# Patient Record
Sex: FEMALE | Race: WHITE | Employment: UNEMPLOYED | RURAL
[De-identification: names, ages, dates, MRNs, and addresses within clinical notes are randomized per-mention and may not be internally consistent; named-entity substitution may affect disease eponyms.]

---

## 2017-03-29 ENCOUNTER — HOSPITAL ENCOUNTER (OUTPATIENT)
Dept: OTHER | Age: 37
Discharge: HOME | End: 2017-03-29
Attending: PEDIATRICS | Admitting: PEDIATRICS

## 2017-03-29 ENCOUNTER — OFFICE VISIT (OUTPATIENT)
Dept: PRIMARY CARE CLINIC | Age: 37
End: 2017-03-29
Payer: MEDICAID

## 2017-03-29 VITALS
DIASTOLIC BLOOD PRESSURE: 76 MMHG | WEIGHT: 253 LBS | BODY MASS INDEX: 42.15 KG/M2 | HEIGHT: 65 IN | HEART RATE: 82 BPM | RESPIRATION RATE: 18 BRPM | SYSTOLIC BLOOD PRESSURE: 132 MMHG | OXYGEN SATURATION: 98 %

## 2017-03-29 DIAGNOSIS — R53.83 FATIGUE, UNSPECIFIED TYPE: ICD-10-CM

## 2017-03-29 DIAGNOSIS — R53.83 FATIGUE, UNSPECIFIED TYPE: Primary | ICD-10-CM

## 2017-03-29 DIAGNOSIS — R63.5 WEIGHT GAIN: ICD-10-CM

## 2017-03-29 DIAGNOSIS — F32.A DEPRESSION, UNSPECIFIED DEPRESSION TYPE: ICD-10-CM

## 2017-03-29 DIAGNOSIS — R40.0 DAYTIME SLEEPINESS: ICD-10-CM

## 2017-03-29 DIAGNOSIS — F17.200 TOBACCO USE DISORDER: ICD-10-CM

## 2017-03-29 LAB
A/G RATIO: 1.6 (ref 0.8–2)
ALBUMIN SERPL-MCNC: 4.5 G/DL (ref 3.4–4.8)
ALP BLD-CCNC: 59 U/L (ref 25–100)
ALT SERPL-CCNC: 43 U/L (ref 4–36)
ANION GAP SERPL CALCULATED.3IONS-SCNC: 14 MMOL/L (ref 3–16)
AST SERPL-CCNC: 22 U/L (ref 8–33)
BASOPHILS ABSOLUTE: 0 K/UL (ref 0–0.1)
BASOPHILS RELATIVE PERCENT: 0.2 %
BILIRUB SERPL-MCNC: <0.2 MG/DL (ref 0.3–1.2)
BUN BLDV-MCNC: 19 MG/DL (ref 6–20)
CALCIUM SERPL-MCNC: 9.9 MG/DL (ref 8.5–10.5)
CHLORIDE BLD-SCNC: 101 MMOL/L (ref 98–107)
CHOLESTEROL, TOTAL: 263 MG/DL (ref 0–200)
CO2: 22 MMOL/L (ref 20–30)
CREAT SERPL-MCNC: 0.8 MG/DL (ref 0.4–1.2)
EOSINOPHILS ABSOLUTE: 0.1 K/UL (ref 0–0.4)
EOSINOPHILS RELATIVE PERCENT: 1.7 %
GFR AFRICAN AMERICAN: >59
GFR NON-AFRICAN AMERICAN: >60
GLOBULIN: 2.9 G/DL
GLUCOSE BLD-MCNC: 108 MG/DL (ref 74–106)
HBA1C MFR BLD: 5.1 %
HCT VFR BLD CALC: 41.3 % (ref 37–47)
HDLC SERPL-MCNC: 47 MG/DL (ref 40–60)
HEMOGLOBIN: 14 G/DL (ref 11.5–16.5)
LDL CHOLESTEROL CALCULATED: 182 MG/DL
LYMPHOCYTES ABSOLUTE: 2.2 K/UL (ref 1.5–4)
LYMPHOCYTES RELATIVE PERCENT: 27.2 % (ref 20–40)
MCH RBC QN AUTO: 29.5 PG (ref 27–32)
MCHC RBC AUTO-ENTMCNC: 33.9 G/DL (ref 31–35)
MCV RBC AUTO: 86.9 FL (ref 80–100)
MONOCYTES ABSOLUTE: 0.6 K/UL (ref 0.2–0.8)
MONOCYTES RELATIVE PERCENT: 7 % (ref 3–10)
NEUTROPHILS ABSOLUTE: 5.2 K/UL (ref 2–7.5)
NEUTROPHILS RELATIVE PERCENT: 63.9 %
PDW BLD-RTO: 13.5 % (ref 11–16)
PLATELET # BLD: 292 K/UL (ref 150–400)
PMV BLD AUTO: 9.9 FL (ref 6–10)
POTASSIUM SERPL-SCNC: 4.5 MMOL/L (ref 3.4–5.1)
RBC # BLD: 4.75 M/UL (ref 3.8–5.8)
SODIUM BLD-SCNC: 137 MMOL/L (ref 136–145)
T4 FREE: 0.92 NG/DL (ref 0.89–1.76)
TOTAL PROTEIN: 7.4 G/DL (ref 6.4–8.3)
TRIGL SERPL-MCNC: 171 MG/DL (ref 0–249)
TSH REFLEX: 14.55 UIU/ML (ref 0.35–5.5)
VLDLC SERPL CALC-MCNC: 34 MG/DL
WBC # BLD: 8.2 K/UL (ref 4–11)

## 2017-03-29 PROCEDURE — 99203 OFFICE O/P NEW LOW 30 MIN: CPT | Performed by: PEDIATRICS

## 2017-03-29 RX ORDER — BUPROPION HYDROCHLORIDE 150 MG/1
150 TABLET ORAL EVERY MORNING
Qty: 30 TABLET | Refills: 3 | Status: SHIPPED | OUTPATIENT
Start: 2017-03-29 | End: 2017-03-29 | Stop reason: SDUPTHER

## 2017-03-29 RX ORDER — BUPROPION HYDROCHLORIDE 150 MG/1
150 TABLET ORAL EVERY MORNING
Qty: 30 TABLET | Refills: 3 | Status: SHIPPED | OUTPATIENT
Start: 2017-03-29 | End: 2018-11-21

## 2017-03-29 ASSESSMENT — ENCOUNTER SYMPTOMS
WHEEZING: 0
VOMITING: 0
SORE THROAT: 0
NAUSEA: 0
SINUS PRESSURE: 0
ABDOMINAL PAIN: 0
COUGH: 0
EYE DISCHARGE: 0
SHORTNESS OF BREATH: 0
BACK PAIN: 0

## 2017-03-30 RX ORDER — LEVOTHYROXINE SODIUM 0.05 MG/1
50 TABLET ORAL DAILY
Qty: 30 TABLET | Refills: 3 | Status: SHIPPED | OUTPATIENT
Start: 2017-03-30 | End: 2018-11-21

## 2017-03-30 RX ORDER — ATORVASTATIN CALCIUM 10 MG/1
10 TABLET, FILM COATED ORAL DAILY
Qty: 30 TABLET | Refills: 3 | Status: SHIPPED | OUTPATIENT
Start: 2017-03-30 | End: 2018-11-21

## 2018-03-04 ENCOUNTER — OFFICE VISIT (OUTPATIENT)
Dept: PRIMARY CARE CLINIC | Age: 38
End: 2018-03-04
Payer: MEDICAID

## 2018-03-04 VITALS — HEART RATE: 120 BPM | BODY MASS INDEX: 42.6 KG/M2 | WEIGHT: 256 LBS | TEMPERATURE: 97 F | OXYGEN SATURATION: 98 %

## 2018-03-04 DIAGNOSIS — B86 SCABIES INFESTATION: Primary | ICD-10-CM

## 2018-03-04 DIAGNOSIS — L29.9 ITCHING: ICD-10-CM

## 2018-03-04 PROCEDURE — 99213 OFFICE O/P EST LOW 20 MIN: CPT | Performed by: NURSE PRACTITIONER

## 2018-03-04 RX ORDER — METHYLPREDNISOLONE 4 MG/1
TABLET ORAL
Qty: 1 KIT | Refills: 0 | Status: SHIPPED | OUTPATIENT
Start: 2018-03-04 | End: 2018-03-10

## 2018-03-04 RX ORDER — PERMETHRIN 50 MG/G
CREAM TOPICAL
Qty: 60 G | Refills: 0 | Status: SHIPPED | OUTPATIENT
Start: 2018-03-04 | End: 2018-11-21

## 2018-03-04 RX ORDER — HYDROXYZINE HYDROCHLORIDE 25 MG/1
25 TABLET, FILM COATED ORAL 3 TIMES DAILY PRN
Qty: 20 TABLET | Refills: 1 | Status: SHIPPED | OUTPATIENT
Start: 2018-03-04 | End: 2018-11-21

## 2018-03-04 ASSESSMENT — ENCOUNTER SYMPTOMS
ALLERGIC/IMMUNOLOGIC NEGATIVE: 1
GASTROINTESTINAL NEGATIVE: 1
RESPIRATORY NEGATIVE: 1
EYES NEGATIVE: 1

## 2018-03-04 NOTE — PROGRESS NOTES
Subjective:      Patient ID: Linda Garner is a 42 y.o. female. Patient is a 69-year-old female that presents with a greater than two-week history of diffuse rash with itching all over her body. Patient is not sought treatment for her symptoms. She denies any changes in her immediate environment. Patient states that her boyfriend as well as her son have now developed a rash with similar symptoms. The mother denies any recent travel. Rash         Review of Systems   Constitutional: Negative. HENT: Negative. Eyes: Negative. Respiratory: Negative. Cardiovascular: Negative. Gastrointestinal: Negative. Endocrine: Negative. Genitourinary: Negative. Musculoskeletal: Negative. Skin: Positive for rash. Positive for two-week history of rash with itching   Allergic/Immunologic: Negative. Neurological: Negative. Hematological: Negative. Psychiatric/Behavioral: Negative. All other systems reviewed and are negative. Objective:   Physical Exam   Constitutional: She is oriented to person, place, and time. She appears well-developed and well-nourished. HENT:   Head: Normocephalic. Right Ear: External ear normal.   Left Ear: External ear normal.   Mouth/Throat: Oropharynx is clear and moist.   Eyes: Pupils are equal, round, and reactive to light. Neck: Normal range of motion. Cardiovascular: Normal rate, regular rhythm and normal heart sounds. Pulmonary/Chest: Effort normal and breath sounds normal.   Musculoskeletal: Normal range of motion. She exhibits no edema. Neurological: She is alert and oriented to person, place, and time. Skin: Skin is warm and dry. Rash noted. Patient has diffuse small pinpoint red eruptions on both arms and hands and there is rash in between the fingers. She also has a diffuse rash on her back and on her abdomen with the appearance of small red bumps and with burrows. The rash is suggestive of scabies.    Psychiatric: She has a

## 2018-05-15 ENCOUNTER — HOSPITAL ENCOUNTER (EMERGENCY)
Facility: HOSPITAL | Age: 38
Discharge: HOME | End: 2018-05-15
Attending: EMERGENCY MEDICINE | Admitting: EMERGENCY MEDICINE

## 2018-05-15 VITALS
HEIGHT: 63 IN | RESPIRATION RATE: 16 BRPM | TEMPERATURE: 98.2 F | OXYGEN SATURATION: 96 % | BODY MASS INDEX: 46.25 KG/M2 | HEART RATE: 70 BPM | DIASTOLIC BLOOD PRESSURE: 99 MMHG | SYSTOLIC BLOOD PRESSURE: 122 MMHG | WEIGHT: 261 LBS

## 2018-05-15 DIAGNOSIS — N39.3 STRESS INCONTINENCE IN FEMALE: ICD-10-CM

## 2018-05-15 DIAGNOSIS — A08.4 GASTROENTERITIS AND COLITIS, VIRAL: Primary | ICD-10-CM

## 2018-05-15 LAB
ANION GAP SERPL CALCULATED.3IONS-SCNC: 14.2 MMOL/L (ref 10–20)
B-HCG UR QL: NEGATIVE
BASOPHILS # BLD AUTO: 0.04 10*3/MM3 (ref 0–0.2)
BASOPHILS NFR BLD AUTO: 0.6 % (ref 0–2.5)
BILIRUB UR QL STRIP: NEGATIVE
BUN BLD-MCNC: 20 MG/DL (ref 7–20)
BUN/CREAT SERPL: 25 (ref 7.1–23.5)
CALCIUM SPEC-SCNC: 8.5 MG/DL (ref 8.4–10.2)
CHLORIDE SERPL-SCNC: 106 MMOL/L (ref 98–107)
CLARITY UR: CLEAR
CO2 SERPL-SCNC: 23 MMOL/L (ref 26–30)
COLOR UR: YELLOW
CREAT BLD-MCNC: 0.8 MG/DL (ref 0.6–1.3)
DEPRECATED RDW RBC AUTO: 40.5 FL (ref 37–54)
EOSINOPHIL # BLD AUTO: 0.12 10*3/MM3 (ref 0–0.7)
EOSINOPHIL NFR BLD AUTO: 1.8 % (ref 0–7)
ERYTHROCYTE [DISTWIDTH] IN BLOOD BY AUTOMATED COUNT: 12.5 % (ref 11.5–14.5)
GFR SERPL CREATININE-BSD FRML MDRD: 80 ML/MIN/1.73
GLUCOSE BLD-MCNC: 97 MG/DL (ref 74–98)
GLUCOSE UR STRIP-MCNC: NEGATIVE MG/DL
HCT VFR BLD AUTO: 37.5 % (ref 37–47)
HGB BLD-MCNC: 12.7 G/DL (ref 12–16)
HGB UR QL STRIP.AUTO: NEGATIVE
IMM GRANULOCYTES # BLD: 0.03 10*3/MM3 (ref 0–0.06)
IMM GRANULOCYTES NFR BLD: 0.4 % (ref 0–0.6)
KETONES UR QL STRIP: NEGATIVE
LEUKOCYTE ESTERASE UR QL STRIP.AUTO: NEGATIVE
LYMPHOCYTES # BLD AUTO: 2.34 10*3/MM3 (ref 0.6–3.4)
LYMPHOCYTES NFR BLD AUTO: 34.8 % (ref 10–50)
MCH RBC QN AUTO: 29.7 PG (ref 27–31)
MCHC RBC AUTO-ENTMCNC: 33.9 G/DL (ref 30–37)
MCV RBC AUTO: 87.8 FL (ref 81–99)
MONOCYTES # BLD AUTO: 0.46 10*3/MM3 (ref 0–0.9)
MONOCYTES NFR BLD AUTO: 6.8 % (ref 0–12)
NEUTROPHILS # BLD AUTO: 3.73 10*3/MM3 (ref 2–6.9)
NEUTROPHILS NFR BLD AUTO: 55.6 % (ref 37–80)
NITRITE UR QL STRIP: NEGATIVE
NRBC BLD MANUAL-RTO: 0 /100 WBC (ref 0–0)
PH UR STRIP.AUTO: 5.5 [PH] (ref 5–8)
PLATELET # BLD AUTO: 227 10*3/MM3 (ref 130–400)
PMV BLD AUTO: 9.3 FL (ref 6–12)
POTASSIUM BLD-SCNC: 4.2 MMOL/L (ref 3.5–5.1)
PROT UR QL STRIP: NEGATIVE
RBC # BLD AUTO: 4.27 10*6/MM3 (ref 4.2–5.4)
SODIUM BLD-SCNC: 139 MMOL/L (ref 137–145)
SP GR UR STRIP: 1.02 (ref 1–1.03)
UROBILINOGEN UR QL STRIP: NORMAL
WBC NRBC COR # BLD: 6.72 10*3/MM3 (ref 4.8–10.8)

## 2018-05-15 PROCEDURE — 80048 BASIC METABOLIC PNL TOTAL CA: CPT | Performed by: EMERGENCY MEDICINE

## 2018-05-15 PROCEDURE — 81025 URINE PREGNANCY TEST: CPT | Performed by: EMERGENCY MEDICINE

## 2018-05-15 PROCEDURE — 99283 EMERGENCY DEPT VISIT LOW MDM: CPT

## 2018-05-15 PROCEDURE — 85025 COMPLETE CBC W/AUTO DIFF WBC: CPT | Performed by: EMERGENCY MEDICINE

## 2018-05-15 PROCEDURE — 81003 URINALYSIS AUTO W/O SCOPE: CPT | Performed by: EMERGENCY MEDICINE

## 2018-05-15 RX ORDER — ONDANSETRON 4 MG/1
4 TABLET, ORALLY DISINTEGRATING ORAL EVERY 6 HOURS PRN
Qty: 10 TABLET | Refills: 0 | Status: SHIPPED | OUTPATIENT
Start: 2018-05-15

## 2018-05-15 RX ORDER — ONDANSETRON 4 MG/1
4 TABLET, ORALLY DISINTEGRATING ORAL ONCE
Status: COMPLETED | OUTPATIENT
Start: 2018-05-15 | End: 2018-05-15

## 2018-05-15 RX ADMIN — ONDANSETRON 4 MG: 4 TABLET, ORALLY DISINTEGRATING ORAL at 09:04

## 2018-11-21 ENCOUNTER — HOSPITAL ENCOUNTER (EMERGENCY)
Facility: HOSPITAL | Age: 38
Discharge: HOME | End: 2018-11-21
Attending: EMERGENCY MEDICINE
Payer: MEDICAID

## 2018-11-21 VITALS
RESPIRATION RATE: 20 BRPM | OXYGEN SATURATION: 96 % | SYSTOLIC BLOOD PRESSURE: 136 MMHG | HEIGHT: 64 IN | TEMPERATURE: 97.7 F | BODY MASS INDEX: 47.8 KG/M2 | HEART RATE: 85 BPM | DIASTOLIC BLOOD PRESSURE: 108 MMHG | WEIGHT: 280 LBS

## 2018-11-21 DIAGNOSIS — S61.012A LACERATION OF LEFT THUMB WITHOUT FOREIGN BODY WITHOUT DAMAGE TO NAIL, INITIAL ENCOUNTER: Primary | ICD-10-CM

## 2018-11-21 PROCEDURE — 6360000002 HC RX W HCPCS: Performed by: EMERGENCY MEDICINE

## 2018-11-21 PROCEDURE — 12001 RPR S/N/AX/GEN/TRNK 2.5CM/<: CPT

## 2018-11-21 PROCEDURE — 2500000003 HC RX 250 WO HCPCS

## 2018-11-21 PROCEDURE — 90471 IMMUNIZATION ADMIN: CPT | Performed by: EMERGENCY MEDICINE

## 2018-11-21 PROCEDURE — 90715 TDAP VACCINE 7 YRS/> IM: CPT | Performed by: EMERGENCY MEDICINE

## 2018-11-21 PROCEDURE — 99282 EMERGENCY DEPT VISIT SF MDM: CPT

## 2018-11-21 RX ORDER — LIDOCAINE HYDROCHLORIDE 20 MG/ML
10 INJECTION, SOLUTION INFILTRATION; PERINEURAL ONCE
Status: DISCONTINUED | OUTPATIENT
Start: 2018-11-21 | End: 2018-11-21

## 2018-11-21 RX ORDER — LIDOCAINE HYDROCHLORIDE 10 MG/ML
5 INJECTION, SOLUTION INFILTRATION; PERINEURAL ONCE
Status: COMPLETED | OUTPATIENT
Start: 2018-11-21 | End: 2018-11-21

## 2018-11-21 RX ORDER — LIDOCAINE HYDROCHLORIDE 10 MG/ML
INJECTION, SOLUTION INFILTRATION; PERINEURAL
Status: COMPLETED
Start: 2018-11-21 | End: 2018-11-21

## 2018-11-21 RX ADMIN — LIDOCAINE HYDROCHLORIDE 5 ML: 10 INJECTION, SOLUTION INFILTRATION; PERINEURAL at 10:41

## 2018-11-21 RX ADMIN — TETANUS TOXOID, REDUCED DIPHTHERIA TOXOID AND ACELLULAR PERTUSSIS VACCINE, ADSORBED 0.5 ML: 5; 2.5; 8; 8; 2.5 SUSPENSION INTRAMUSCULAR at 10:40

## 2018-11-21 ASSESSMENT — PAIN SCALES - GENERAL: PAINLEVEL_OUTOF10: 0

## 2018-11-21 NOTE — ED PROVIDER NOTES
father well          SOCIAL HISTORY       Social History     Social History    Marital status: Single     Spouse name: N/A    Number of children: N/A    Years of education: N/A     Social History Main Topics    Smoking status: Current Every Day Smoker     Packs/day: 1.00     Years: 10.00     Types: Cigarettes    Smokeless tobacco: Never Used    Alcohol use Yes      Comment: drinks liquor socially just ever so often    Drug use: No    Sexual activity: Not Asked     Other Topics Concern    None     Social History Narrative    None         PHYSICAL EXAM    (up to 7 for level 4, 8 or more for level 5)     ED Triage Vitals [11/21/18 1023]   BP Temp Temp Source Pulse Resp SpO2 Height Weight   (!) 136/108 97.7 °F (36.5 °C) Oral 85 20 96 % 5' 4\" (1.626 m) 280 lb (127 kg)       Physical Exam  General :Patient is awake, alert, oriented, in no acute distress, nontoxic appearing  HEENT: Pupils are equally round and reactive to light, EOMI, conjunctivae clear, sclerae white, there is no injection no icterus. Oral mucosa ismoist, no exudate. Uvula is midline  Neck: Neck is supple, full range of motion, trachea midline  Cardiac: Heart regular rate, rhythm, no murmurs, rubs, or gallops  Lungs: Lungs are clear to auscultation, there is no wheezing, rhonchi, or rales. There is no use of accessory muscles. Abdomen: Abdomen is soft, nontender, nondistended. There is no firm or pulsatile masses, no rebound rigidity or guarding. Musculoskeletal: range of motion intact, no signs of any tendon rupture. 5 out of 5 strength in all 4 extremities. No focal muscle deficits are appreciated  Neuro: Motor intact, sensory intact, level of consciousness is normal  Dermatology: there is a laceration of the left thumb, volar surface between the  MCP and the  Interphalangeal joint this is superficial, horizontal in nature, 1 cm in length, with edges approximated nicely. Skin is warm and dry  Psych: Mentation is grossly normal, cognition is

## 2019-03-10 ENCOUNTER — OFFICE VISIT (OUTPATIENT)
Dept: PRIMARY CARE CLINIC | Age: 39
End: 2019-03-10
Payer: MEDICAID

## 2019-03-10 VITALS
BODY MASS INDEX: 44.11 KG/M2 | DIASTOLIC BLOOD PRESSURE: 93 MMHG | HEART RATE: 89 BPM | TEMPERATURE: 98.9 F | WEIGHT: 257 LBS | OXYGEN SATURATION: 97 % | SYSTOLIC BLOOD PRESSURE: 134 MMHG

## 2019-03-10 DIAGNOSIS — R30.0 DYSURIA: Primary | ICD-10-CM

## 2019-03-10 LAB
BILIRUBIN, POC: NEGATIVE
BLOOD URINE, POC: NEGATIVE
CLARITY, POC: CLEAR
COLOR, POC: YELLOW
GLUCOSE URINE, POC: NEGATIVE
KETONES, POC: NEGATIVE
LEUKOCYTE EST, POC: NEGATIVE
NITRITE, POC: NEGATIVE
PH, POC: 5
PROTEIN, POC: POSITIVE
SPECIFIC GRAVITY, POC: 1.01
UROBILINOGEN, POC: 0.2

## 2019-03-10 PROCEDURE — 81002 URINALYSIS NONAUTO W/O SCOPE: CPT | Performed by: NURSE PRACTITIONER

## 2019-03-10 PROCEDURE — 99213 OFFICE O/P EST LOW 20 MIN: CPT | Performed by: NURSE PRACTITIONER

## 2019-03-10 RX ORDER — PHENAZOPYRIDINE HYDROCHLORIDE 100 MG/1
100 TABLET, FILM COATED ORAL 3 TIMES DAILY PRN
Qty: 10 TABLET | Refills: 0 | Status: SHIPPED | OUTPATIENT
Start: 2019-03-10 | End: 2020-02-21

## 2019-03-10 RX ORDER — ONDANSETRON 4 MG/1
4 TABLET, FILM COATED ORAL 3 TIMES DAILY PRN
Qty: 30 TABLET | Refills: 0 | Status: SHIPPED | OUTPATIENT
Start: 2019-03-10 | End: 2020-02-21

## 2019-03-10 RX ORDER — CIPROFLOXACIN 250 MG/1
250 TABLET, FILM COATED ORAL 2 TIMES DAILY
Qty: 6 TABLET | Refills: 0 | Status: SHIPPED | OUTPATIENT
Start: 2019-03-10 | End: 2019-03-13

## 2019-03-10 ASSESSMENT — ENCOUNTER SYMPTOMS: NAUSEA: 1

## 2020-02-21 ENCOUNTER — APPOINTMENT (OUTPATIENT)
Dept: GENERAL RADIOLOGY | Facility: HOSPITAL | Age: 40
End: 2020-02-21
Payer: MEDICAID

## 2020-02-21 ENCOUNTER — HOSPITAL ENCOUNTER (EMERGENCY)
Facility: HOSPITAL | Age: 40
Discharge: HOME | End: 2020-02-21
Attending: FAMILY MEDICINE
Payer: MEDICAID

## 2020-02-21 VITALS
HEART RATE: 100 BPM | BODY MASS INDEX: 44.39 KG/M2 | WEIGHT: 260 LBS | TEMPERATURE: 98.2 F | SYSTOLIC BLOOD PRESSURE: 124 MMHG | OXYGEN SATURATION: 94 % | RESPIRATION RATE: 16 BRPM | HEIGHT: 64 IN | DIASTOLIC BLOOD PRESSURE: 95 MMHG

## 2020-02-21 LAB
RAPID INFLUENZA  B AGN: NEGATIVE
RAPID INFLUENZA A AGN: NEGATIVE

## 2020-02-21 PROCEDURE — 99283 EMERGENCY DEPT VISIT LOW MDM: CPT

## 2020-02-21 PROCEDURE — 6370000000 HC RX 637 (ALT 250 FOR IP): Performed by: FAMILY MEDICINE

## 2020-02-21 PROCEDURE — 71046 X-RAY EXAM CHEST 2 VIEWS: CPT

## 2020-02-21 PROCEDURE — 87804 INFLUENZA ASSAY W/OPTIC: CPT

## 2020-02-21 RX ORDER — AMOXICILLIN AND CLAVULANATE POTASSIUM 875; 125 MG/1; MG/1
1 TABLET, FILM COATED ORAL ONCE
Status: COMPLETED | OUTPATIENT
Start: 2020-02-21 | End: 2020-02-21

## 2020-02-21 RX ORDER — PREDNISONE 20 MG/1
TABLET ORAL
Qty: 13 TABLET | Refills: 0 | Status: SHIPPED | OUTPATIENT
Start: 2020-02-21 | End: 2020-03-06 | Stop reason: ALTCHOICE

## 2020-02-21 RX ORDER — ALBUTEROL SULFATE 90 UG/1
2 AEROSOL, METERED RESPIRATORY (INHALATION)
Qty: 1 INHALER | Refills: 0 | Status: SHIPPED | OUTPATIENT
Start: 2020-02-21 | End: 2020-03-17 | Stop reason: SDUPTHER

## 2020-02-21 RX ORDER — BENZONATATE 100 MG/1
100 CAPSULE ORAL EVERY 8 HOURS PRN
Qty: 21 CAPSULE | Refills: 0 | Status: SHIPPED | OUTPATIENT
Start: 2020-02-21 | End: 2020-02-28

## 2020-02-21 RX ORDER — AMOXICILLIN AND CLAVULANATE POTASSIUM 875; 125 MG/1; MG/1
1 TABLET, FILM COATED ORAL 2 TIMES DAILY
Qty: 14 TABLET | Refills: 0 | Status: SHIPPED | OUTPATIENT
Start: 2020-02-21 | End: 2020-02-28

## 2020-02-21 RX ORDER — PREDNISONE 20 MG/1
40 TABLET ORAL ONCE
Status: COMPLETED | OUTPATIENT
Start: 2020-02-21 | End: 2020-02-21

## 2020-02-21 RX ORDER — BENZONATATE 100 MG/1
100 CAPSULE ORAL ONCE
Status: COMPLETED | OUTPATIENT
Start: 2020-02-21 | End: 2020-02-21

## 2020-02-21 RX ADMIN — AMOXICILLIN AND CLAVULANATE POTASSIUM 1 TABLET: 875; 125 TABLET, FILM COATED ORAL at 23:24

## 2020-02-21 RX ADMIN — BENZONATATE 100 MG: 100 CAPSULE ORAL at 23:24

## 2020-02-21 RX ADMIN — PREDNISONE 40 MG: 20 TABLET ORAL at 23:24

## 2020-02-21 ASSESSMENT — ENCOUNTER SYMPTOMS
SHORTNESS OF BREATH: 1
COUGH: 1
WHEEZING: 1

## 2020-02-22 NOTE — ED PROVIDER NOTES
 Financial resource strain: None    Food insecurity:     Worry: None     Inability: None    Transportation needs:     Medical: None     Non-medical: None   Tobacco Use    Smoking status: Current Every Day Smoker     Packs/day: 0.50     Years: 10.00     Pack years: 5.00     Types: Cigarettes    Smokeless tobacco: Never Used   Substance and Sexual Activity    Alcohol use: Yes     Comment: drinks liquor socially just ever so often    Drug use: No    Sexual activity: None   Lifestyle    Physical activity:     Days per week: None     Minutes per session: None    Stress: None   Relationships    Social connections:     Talks on phone: None     Gets together: None     Attends Jewish service: None     Active member of club or organization: None     Attends meetings of clubs or organizations: None     Relationship status: None    Intimate partner violence:     Fear of current or ex partner: None     Emotionally abused: None     Physically abused: None     Forced sexual activity: None   Other Topics Concern    None   Social History Narrative    None       SCREENINGS             PHYSICAL EXAM    (up to 7 for level 4, 8 or more for level 5)     ED Triage Vitals [02/21/20 2156]   BP Temp Temp Source Pulse Resp SpO2 Height Weight   (!) 143/90 98.2 °F (36.8 °C) Oral 97 20 96 % 5' 4\" (1.626 m) 260 lb (117.9 kg)       Physical Exam  Vitals signs and nursing note reviewed. Constitutional:       General: She is not in acute distress. Appearance: She is obese. Comments: Pt intermittently coughing in room but no acute distress   HENT:      Head: Normocephalic. Eyes:      Extraocular Movements: Extraocular movements intact. Conjunctiva/sclera: Conjunctivae normal.      Pupils: Pupils are equal, round, and reactive to light. Neck:      Musculoskeletal: Neck supple. Cardiovascular:      Rate and Rhythm: Normal rate and regular rhythm. Heart sounds: Normal heart sounds.    Pulmonary:      Effort: patient's condition which required my urgent intervention. CONSULTS:  None    PROCEDURES:  None    PROGRESS NOTES:    Will start pt on antibiotics, steroids, and give ventolin inhaler. Pt to see PCP in 1 week if symptoms worsen    FINAL IMPRESSION      1. Cough New Problem   2. Acute lower respiratory tract infection New Problem   3.  Acute otalgia, right New Problem         DISPOSITION/PLAN   DISPOSITION Decision To Discharge 02/21/2020 10:57:39 PM      PATIENT REFERRED TO:  Olga Ogden DO  [id number] [address]  [phone]    Schedule an appointment as soon as possible for a visit in 1 week  If symptoms worsen      DISCHARGE MEDICATIONS:  New Prescriptions    ALBUTEROL SULFATE  (90 BASE) MCG/ACT INHALER    Inhale 2 puffs into the lungs every 4-6 hours as needed for Wheezing    AMOXICILLIN-CLAVULANATE (AUGMENTIN) 875-125 MG PER TABLET    Take 1 tablet by mouth 2 times daily for 7 days    BENZONATATE (TESSALON) 100 MG CAPSULE    Take 1 capsule by mouth every 8 hours as needed for Cough    PREDNISONE (DELTASONE) 20 MG TABLET    Take 3 tabs on day 1&2, take 2 tabs on day 3&4, take 1 tab on day 5&6, then stop       (Please note that portions of this note were completed with a voice recognition program.  Efforts were made to edit the dictations but occasionallywords are mis-transcribed.)    Trenton Sands DO (electronically signed)  Attending Emergency Physician          Trenton Sands,   02/21/20 [address],   02/21/20 1179

## 2020-02-22 NOTE — ED TRIAGE NOTES
Productive cough x two weeks, was initially productive of yellow sputum, but is not bringing up as much sputum now. Has SOA. And right ear has been feeling full x 2 days. States she has been having fever. No other complaints.

## 2020-03-06 ENCOUNTER — HOSPITAL ENCOUNTER (OUTPATIENT)
Facility: HOSPITAL | Age: 40
Discharge: HOME | End: 2020-03-06
Payer: MEDICAID

## 2020-03-06 ENCOUNTER — OFFICE VISIT (OUTPATIENT)
Dept: PRIMARY CARE CLINIC | Age: 40
End: 2020-03-06
Payer: MEDICAID

## 2020-03-06 VITALS
WEIGHT: 272 LBS | OXYGEN SATURATION: 96 % | DIASTOLIC BLOOD PRESSURE: 101 MMHG | BODY MASS INDEX: 46.69 KG/M2 | TEMPERATURE: 98.2 F | HEART RATE: 76 BPM | RESPIRATION RATE: 18 BRPM | SYSTOLIC BLOOD PRESSURE: 160 MMHG

## 2020-03-06 LAB
A/G RATIO: 1.4 (ref 0.8–2)
ALBUMIN SERPL-MCNC: 4.4 G/DL (ref 3.4–4.8)
ALP BLD-CCNC: 64 U/L (ref 25–100)
ALT SERPL-CCNC: 152 U/L (ref 4–36)
ANION GAP SERPL CALCULATED.3IONS-SCNC: 11 MMOL/L (ref 3–16)
AST SERPL-CCNC: 69 U/L (ref 8–33)
BASOPHILS ABSOLUTE: 0 K/UL (ref 0–0.1)
BASOPHILS RELATIVE PERCENT: 0.3 %
BILIRUB SERPL-MCNC: 0.6 MG/DL (ref 0.3–1.2)
BUN BLDV-MCNC: 14 MG/DL (ref 6–20)
CALCIUM SERPL-MCNC: 9.4 MG/DL (ref 8.5–10.5)
CHLORIDE BLD-SCNC: 102 MMOL/L (ref 98–107)
CHOLESTEROL, TOTAL: 300 MG/DL (ref 0–200)
CO2: 25 MMOL/L (ref 20–30)
CREAT SERPL-MCNC: 0.9 MG/DL (ref 0.4–1.2)
EOSINOPHILS ABSOLUTE: 0.2 K/UL (ref 0–0.4)
EOSINOPHILS RELATIVE PERCENT: 2.1 %
GFR AFRICAN AMERICAN: >59
GFR NON-AFRICAN AMERICAN: >60
GLOBULIN: 3.1 G/DL
GLUCOSE BLD-MCNC: 100 MG/DL (ref 74–106)
HCT VFR BLD CALC: 44.3 % (ref 37–47)
HDLC SERPL-MCNC: 43 MG/DL (ref 40–60)
HEMOGLOBIN: 14.6 G/DL (ref 11.5–16.5)
IMMATURE GRANULOCYTES #: 0.1 K/UL
IMMATURE GRANULOCYTES %: 0.7 % (ref 0–5)
LDL CHOLESTEROL CALCULATED: 218 MG/DL
LYMPHOCYTES ABSOLUTE: 2.3 K/UL (ref 1.5–4)
LYMPHOCYTES RELATIVE PERCENT: 22.8 %
MCH RBC QN AUTO: 29.2 PG (ref 27–32)
MCHC RBC AUTO-ENTMCNC: 33 G/DL (ref 31–35)
MCV RBC AUTO: 88.6 FL (ref 80–100)
MONOCYTES ABSOLUTE: 0.6 K/UL (ref 0.2–0.8)
MONOCYTES RELATIVE PERCENT: 5.6 %
NEUTROPHILS ABSOLUTE: 6.8 K/UL (ref 2–7.5)
NEUTROPHILS RELATIVE PERCENT: 68.5 %
PDW BLD-RTO: 12.5 % (ref 11–16)
PLATELET # BLD: 285 K/UL (ref 150–400)
PMV BLD AUTO: 9.4 FL (ref 6–10)
POTASSIUM SERPL-SCNC: 4.7 MMOL/L (ref 3.4–5.1)
RBC # BLD: 5 M/UL (ref 3.8–5.8)
SODIUM BLD-SCNC: 138 MMOL/L (ref 136–145)
TOTAL PROTEIN: 7.5 G/DL (ref 6.4–8.3)
TRIGL SERPL-MCNC: 195 MG/DL (ref 0–249)
TSH SERPL DL<=0.05 MIU/L-ACNC: 9.96 UIU/ML (ref 0.35–5.5)
VLDLC SERPL CALC-MCNC: 39 MG/DL
WBC # BLD: 9.9 K/UL (ref 4–11)

## 2020-03-06 PROCEDURE — 80053 COMPREHEN METABOLIC PANEL: CPT

## 2020-03-06 PROCEDURE — 99213 OFFICE O/P EST LOW 20 MIN: CPT | Performed by: NURSE PRACTITIONER

## 2020-03-06 PROCEDURE — 84443 ASSAY THYROID STIM HORMONE: CPT

## 2020-03-06 PROCEDURE — 80061 LIPID PANEL: CPT

## 2020-03-06 PROCEDURE — 85025 COMPLETE CBC W/AUTO DIFF WBC: CPT

## 2020-03-06 RX ORDER — HYDROCHLOROTHIAZIDE 12.5 MG/1
12.5 CAPSULE, GELATIN COATED ORAL EVERY MORNING
Qty: 30 CAPSULE | Refills: 0 | Status: SHIPPED | OUTPATIENT
Start: 2020-03-06 | End: 2020-03-17 | Stop reason: SDUPTHER

## 2020-03-06 RX ORDER — LEVOTHYROXINE SODIUM 0.03 MG/1
25 TABLET ORAL DAILY
Qty: 30 TABLET | Refills: 0 | Status: SHIPPED | OUTPATIENT
Start: 2020-03-06 | End: 2020-03-17 | Stop reason: SDUPTHER

## 2020-03-06 RX ORDER — FLUTICASONE PROPIONATE 50 MCG
2 SPRAY, SUSPENSION (ML) NASAL DAILY
Qty: 3 BOTTLE | Refills: 1 | Status: SHIPPED | OUTPATIENT
Start: 2020-03-06 | End: 2020-03-17 | Stop reason: SDUPTHER

## 2020-03-06 RX ORDER — LORATADINE 10 MG/1
10 TABLET ORAL DAILY
Qty: 30 TABLET | Refills: 0 | Status: SHIPPED | OUTPATIENT
Start: 2020-03-06 | End: 2020-03-17 | Stop reason: SDUPTHER

## 2020-03-06 ASSESSMENT — ENCOUNTER SYMPTOMS
GASTROINTESTINAL NEGATIVE: 1
CHOKING: 1

## 2020-03-06 ASSESSMENT — PATIENT HEALTH QUESTIONNAIRE - PHQ9: DEPRESSION UNABLE TO ASSESS: URGENT/EMERGENT SITUATION

## 2020-03-06 NOTE — PROGRESS NOTES
Pt co she was diagnosed with bronchitis and after she finished meds she can't hear out of her left ear. Pt states she still has a cough as well. Pt also wants to get thyroid checked. HM not addressed this visit , sick visit.

## 2020-03-12 ENCOUNTER — HOSPITAL ENCOUNTER (OUTPATIENT)
Dept: ULTRASOUND IMAGING | Facility: HOSPITAL | Age: 40
Discharge: HOME | End: 2020-03-12
Payer: MEDICAID

## 2020-03-12 ENCOUNTER — TELEPHONE (OUTPATIENT)
Dept: PRIMARY CARE CLINIC | Age: 40
End: 2020-03-12

## 2020-03-12 PROCEDURE — 76536 US EXAM OF HEAD AND NECK: CPT

## 2020-03-12 NOTE — TELEPHONE ENCOUNTER
----- Message from GUY Cano NP sent at 3/12/2020  3:01 PM EDT -----  Please call patient and have her make an appointment ASAP with her pcp for discussion of test results, please ensure pcp is aware of results. Thanks!

## 2020-03-17 ENCOUNTER — OFFICE VISIT (OUTPATIENT)
Dept: PRIMARY CARE CLINIC | Age: 40
End: 2020-03-17
Payer: MEDICAID

## 2020-03-17 VITALS
SYSTOLIC BLOOD PRESSURE: 148 MMHG | BODY MASS INDEX: 46.26 KG/M2 | OXYGEN SATURATION: 96 % | RESPIRATION RATE: 16 BRPM | HEART RATE: 86 BPM | WEIGHT: 271 LBS | DIASTOLIC BLOOD PRESSURE: 96 MMHG | HEIGHT: 64 IN

## 2020-03-17 PROCEDURE — 99214 OFFICE O/P EST MOD 30 MIN: CPT | Performed by: PEDIATRICS

## 2020-03-17 PROCEDURE — 96160 PT-FOCUSED HLTH RISK ASSMT: CPT | Performed by: PEDIATRICS

## 2020-03-17 PROCEDURE — G8431 POS CLIN DEPRES SCRN F/U DOC: HCPCS | Performed by: PEDIATRICS

## 2020-03-17 RX ORDER — ALBUTEROL SULFATE 90 UG/1
2 AEROSOL, METERED RESPIRATORY (INHALATION)
Qty: 1 INHALER | Refills: 3 | Status: SHIPPED | OUTPATIENT
Start: 2020-03-17 | End: 2020-06-17 | Stop reason: SDUPTHER

## 2020-03-17 RX ORDER — LEVOTHYROXINE SODIUM 0.03 MG/1
25 TABLET ORAL DAILY
Qty: 30 TABLET | Refills: 3 | Status: SHIPPED | OUTPATIENT
Start: 2020-03-17 | End: 2020-06-17 | Stop reason: SDUPTHER

## 2020-03-17 RX ORDER — LORATADINE 10 MG/1
10 TABLET ORAL DAILY
Qty: 30 TABLET | Refills: 3 | Status: SHIPPED | OUTPATIENT
Start: 2020-03-17 | End: 2020-06-17 | Stop reason: SDUPTHER

## 2020-03-17 RX ORDER — FLUTICASONE PROPIONATE 50 MCG
2 SPRAY, SUSPENSION (ML) NASAL DAILY
Qty: 3 BOTTLE | Refills: 3 | Status: SHIPPED | OUTPATIENT
Start: 2020-03-17 | End: 2020-06-17 | Stop reason: SDUPTHER

## 2020-03-17 RX ORDER — HYDROCHLOROTHIAZIDE 12.5 MG/1
12.5 CAPSULE, GELATIN COATED ORAL EVERY MORNING
Qty: 30 CAPSULE | Refills: 3 | Status: SHIPPED | OUTPATIENT
Start: 2020-03-17 | End: 2020-06-17 | Stop reason: SDUPTHER

## 2020-03-17 ASSESSMENT — ENCOUNTER SYMPTOMS
WHEEZING: 0
VOMITING: 0
SHORTNESS OF BREATH: 0
SORE THROAT: 0
EYE DISCHARGE: 0
COUGH: 0
SINUS PRESSURE: 0
BACK PAIN: 0
NAUSEA: 0
ABDOMINAL PAIN: 0

## 2020-03-17 ASSESSMENT — PATIENT HEALTH QUESTIONNAIRE - PHQ9
8. MOVING OR SPEAKING SO SLOWLY THAT OTHER PEOPLE COULD HAVE NOTICED. OR THE OPPOSITE, BEING SO FIGETY OR RESTLESS THAT YOU HAVE BEEN MOVING AROUND A LOT MORE THAN USUAL: 1
SUM OF ALL RESPONSES TO PHQ9 QUESTIONS 1 & 2: 5
2. FEELING DOWN, DEPRESSED OR HOPELESS: 3
SUM OF ALL RESPONSES TO PHQ QUESTIONS 1-9: 15
9. THOUGHTS THAT YOU WOULD BE BETTER OFF DEAD, OR OF HURTING YOURSELF: 0
7. TROUBLE CONCENTRATING ON THINGS, SUCH AS READING THE NEWSPAPER OR WATCHING TELEVISION: 0
1. LITTLE INTEREST OR PLEASURE IN DOING THINGS: 2
3. TROUBLE FALLING OR STAYING ASLEEP: 3
SUM OF ALL RESPONSES TO PHQ QUESTIONS 1-9: 15
6. FEELING BAD ABOUT YOURSELF - OR THAT YOU ARE A FAILURE OR HAVE LET YOURSELF OR YOUR FAMILY DOWN: 0
4. FEELING TIRED OR HAVING LITTLE ENERGY: 3
10. IF YOU CHECKED OFF ANY PROBLEMS, HOW DIFFICULT HAVE THESE PROBLEMS MADE IT FOR YOU TO DO YOUR WORK, TAKE CARE OF THINGS AT HOME, OR GET ALONG WITH OTHER PEOPLE: 2
5. POOR APPETITE OR OVEREATING: 3

## 2020-03-17 NOTE — PROGRESS NOTES
03/29/2017 5.1     LDL Calculated (mg/dL)   Date Value   03/06/2020 218 (H)         Lab Results   Component Value Date    WBC 9.9 03/06/2020    HGB 14.6 03/06/2020    HCT 44.3 03/06/2020    MCV 88.6 03/06/2020     03/06/2020    LYMPHOPCT 22.8 03/06/2020    RBC 5.00 03/06/2020    MCH 29.2 03/06/2020    MCHC 33.0 03/06/2020    RDW 12.5 03/06/2020       Lab Results   Component Value Date     03/06/2020    K 4.7 03/06/2020     03/06/2020    CO2 25 03/06/2020    BUN 14 03/06/2020    CREATININE 0.9 03/06/2020    GLUCOSE 100 03/06/2020    CALCIUM 9.4 03/06/2020    PROT 7.5 03/06/2020    LABALBU 4.4 03/06/2020    BILITOT 0.6 03/06/2020    ALKPHOS 64 03/06/2020    AST 69 (H) 03/06/2020     (H) 03/06/2020    LABGLOM >60 03/06/2020    GFRAA >59 03/06/2020    AGRATIO 1.4 03/06/2020    GLOB 3.1 03/06/2020       Lab Results   Component Value Date    TSH 9.96 (H) 03/06/2020       Current Outpatient Medications   Medication Sig Dispense Refill    albuterol sulfate  (90 Base) MCG/ACT inhaler Inhale 2 puffs into the lungs every 4-6 hours as needed for Wheezing 1 Inhaler 3    hydrochlorothiazide (MICROZIDE) 12.5 MG capsule Take 1 capsule by mouth every morning 30 capsule 3    levothyroxine (SYNTHROID) 25 MCG tablet Take 1 tablet by mouth daily 30 tablet 3    fluticasone (FLONASE) 50 MCG/ACT nasal spray 2 sprays by Each Nostril route daily 3 Bottle 3    loratadine (CLARITIN) 10 MG tablet Take 1 tablet by mouth daily 30 tablet 3    sertraline (ZOLOFT) 50 MG tablet Take 1 tablet by mouth daily 30 tablet 3     No current facility-administered medications for this visit.          During this visit the following were done:  Labs reviewed []    Labs ordered[]    Radiology reports Reviewed[]    Radiology ordered[]    EKG, echo, and/or stress testreviewed []    EEG resultsreviewed  []    EEG reviewed and interpretedper myself   []    Previousprovider/old records requested  []    Previous provider Records

## 2020-04-29 ENCOUNTER — TRANSCRIBE ORDERS (OUTPATIENT)
Dept: ADMINISTRATIVE | Facility: HOSPITAL | Age: 40
End: 2020-04-29

## 2020-04-29 DIAGNOSIS — R13.10 DYSPHAGIA, UNSPECIFIED TYPE: Primary | ICD-10-CM

## 2020-04-29 DIAGNOSIS — E04.1 THYROID NODULE: ICD-10-CM

## 2020-05-12 ENCOUNTER — HOSPITAL ENCOUNTER (OUTPATIENT)
Dept: ULTRASOUND IMAGING | Facility: HOSPITAL | Age: 40
Discharge: HOME | End: 2020-05-12
Admitting: OTOLARYNGOLOGY

## 2020-05-12 ENCOUNTER — APPOINTMENT (OUTPATIENT)
Dept: GENERAL RADIOLOGY | Facility: HOSPITAL | Age: 40
End: 2020-05-12

## 2020-05-12 DIAGNOSIS — E04.1 THYROID NODULE: ICD-10-CM

## 2020-05-12 PROCEDURE — 76536 US EXAM OF HEAD AND NECK: CPT

## 2020-06-17 ENCOUNTER — VIRTUAL VISIT (OUTPATIENT)
Dept: PRIMARY CARE CLINIC | Age: 40
End: 2020-06-17
Payer: MEDICAID

## 2020-06-17 PROBLEM — H65.92 MIDDLE EAR EFFUSION, LEFT: Status: ACTIVE | Noted: 2020-06-17

## 2020-06-17 PROBLEM — F32.A DEPRESSION: Status: ACTIVE | Noted: 2020-06-17

## 2020-06-17 PROBLEM — E66.01 MORBID OBESITY WITH BMI OF 45.0-49.9, ADULT (HCC): Status: ACTIVE | Noted: 2020-06-17

## 2020-06-17 PROBLEM — I10 HYPERTENSION: Status: ACTIVE | Noted: 2020-06-17

## 2020-06-17 PROBLEM — J41.0 SIMPLE CHRONIC BRONCHITIS (HCC): Status: ACTIVE | Noted: 2020-06-17

## 2020-06-17 PROBLEM — E03.9 HYPOTHYROIDISM: Status: ACTIVE | Noted: 2020-06-17

## 2020-06-17 PROCEDURE — 99213 OFFICE O/P EST LOW 20 MIN: CPT | Performed by: PEDIATRICS

## 2020-06-17 RX ORDER — PHENTERMINE HYDROCHLORIDE 37.5 MG/1
37.5 TABLET ORAL
Qty: 30 TABLET | Refills: 0 | Status: SHIPPED | OUTPATIENT
Start: 2020-06-17 | End: 2020-06-17 | Stop reason: SDUPTHER

## 2020-06-17 RX ORDER — LORATADINE 10 MG/1
10 TABLET ORAL DAILY
Qty: 30 TABLET | Refills: 3 | Status: SHIPPED | OUTPATIENT
Start: 2020-06-17 | End: 2021-01-20 | Stop reason: SDUPTHER

## 2020-06-17 RX ORDER — HYDROCHLOROTHIAZIDE 12.5 MG/1
12.5 CAPSULE, GELATIN COATED ORAL EVERY MORNING
Qty: 30 CAPSULE | Refills: 3 | Status: SHIPPED | OUTPATIENT
Start: 2020-06-17 | End: 2020-06-17 | Stop reason: SDUPTHER

## 2020-06-17 RX ORDER — PHENTERMINE HYDROCHLORIDE 37.5 MG/1
37.5 TABLET ORAL
Qty: 30 TABLET | Refills: 0 | Status: SHIPPED | OUTPATIENT
Start: 2020-06-17 | End: 2020-07-22 | Stop reason: SDUPTHER

## 2020-06-17 RX ORDER — FLUTICASONE PROPIONATE 50 MCG
2 SPRAY, SUSPENSION (ML) NASAL DAILY
Qty: 3 BOTTLE | Refills: 3 | Status: SHIPPED | OUTPATIENT
Start: 2020-06-17 | End: 2020-06-17 | Stop reason: SDUPTHER

## 2020-06-17 RX ORDER — ALBUTEROL SULFATE 90 UG/1
2 AEROSOL, METERED RESPIRATORY (INHALATION)
Qty: 1 INHALER | Refills: 3 | Status: SHIPPED | OUTPATIENT
Start: 2020-06-17 | End: 2020-09-11

## 2020-06-17 RX ORDER — LEVOTHYROXINE SODIUM 0.03 MG/1
25 TABLET ORAL DAILY
Qty: 30 TABLET | Refills: 3 | Status: SHIPPED | OUTPATIENT
Start: 2020-06-17 | End: 2020-06-17 | Stop reason: SDUPTHER

## 2020-06-17 RX ORDER — HYDROCHLOROTHIAZIDE 12.5 MG/1
12.5 CAPSULE, GELATIN COATED ORAL EVERY MORNING
Qty: 30 CAPSULE | Refills: 3 | Status: SHIPPED | OUTPATIENT
Start: 2020-06-17 | End: 2021-01-20 | Stop reason: SDUPTHER

## 2020-06-17 RX ORDER — FLUTICASONE PROPIONATE 50 MCG
2 SPRAY, SUSPENSION (ML) NASAL DAILY
Qty: 3 BOTTLE | Refills: 3 | Status: SHIPPED | OUTPATIENT
Start: 2020-06-17 | End: 2021-01-20 | Stop reason: SDUPTHER

## 2020-06-17 RX ORDER — ALBUTEROL SULFATE 90 UG/1
2 AEROSOL, METERED RESPIRATORY (INHALATION)
Qty: 1 INHALER | Refills: 3 | Status: SHIPPED | OUTPATIENT
Start: 2020-06-17 | End: 2020-06-17 | Stop reason: SDUPTHER

## 2020-06-17 RX ORDER — LEVOTHYROXINE SODIUM 0.03 MG/1
25 TABLET ORAL DAILY
Qty: 30 TABLET | Refills: 3 | Status: SHIPPED | OUTPATIENT
Start: 2020-06-17 | End: 2020-06-29 | Stop reason: DRUGHIGH

## 2020-06-17 RX ORDER — LORATADINE 10 MG/1
10 TABLET ORAL DAILY
Qty: 30 TABLET | Refills: 3 | Status: SHIPPED | OUTPATIENT
Start: 2020-06-17 | End: 2020-06-17 | Stop reason: SDUPTHER

## 2020-06-17 NOTE — PROGRESS NOTES
Ana Juares is a 37 y.o. female evaluated via telephone on 6/17/2020. Consent:  She and/or health care decision maker is aware that that she may receive a bill for this telephone service, depending on her insurance coverage, and has provided verbal consent to proceed: {YES/NO/NA-Consent obtained within past 12 months:40489}      Documentation:  I communicated with the patient and/or health care decision maker about ***. Details of this discussion including any medical advice provided: ***    Problem List     None            I {AFFIRM/DO NOT AFFIRM:07353::\"affirm\"} this is a Patient Initiated Episode with a Patient who has not had a related appointment within my department in the past 7 days or scheduled within the next 24 hours.     Patient identification was verified at the start of the visit: {YES/NO:48157}    Total Time: {minutes:55533::\"5-10 minutes\"}    Note: not billable if this call serves to triage the patient into an appointment for the relevant concern      Landon Lopez

## 2020-06-17 NOTE — PROGRESS NOTES
Gladis Wu is a 37 y.o. female evaluated via telephone on 6/17/2020. Consent:  She and/or health care decision maker is aware that that she may receive a bill for this telephone service, depending on her insurance coverage, and has provided verbal consent to proceed: Yes      Documentation:  I communicated with the patient and/or health care decision maker about chronic conditions and weight loss. Details of this discussion including any medical advice provided:     She reports she is stable related to chronic conditions. She is asking about help with weight loss. She has tried diet and exercise without results. She is very involved in plan. She would like to try medication to help with this issue. COPD   There is no cough, shortness of breath or wheezing. This is a chronic problem. The current episode started more than 1 year ago. The problem occurs rarely. The problem has been unchanged. Pertinent negatives include no chest pain, fever, headaches or sore throat. Hypertension   This is a chronic problem. The current episode started more than 1 month ago. The problem is unchanged. The problem is controlled. Pertinent negatives include no chest pain, headaches, palpitations or shortness of breath. Review of Systems   Constitutional: Negative for chills and fever. HENT: Negative for congestion, sinus pressure and sore throat. Eyes: Negative for discharge and visual disturbance. Respiratory: Negative for cough, shortness of breath and wheezing. Cardiovascular: Negative for chest pain and palpitations. Gastrointestinal: Negative for abdominal pain, nausea and vomiting. Endocrine: Negative for cold intolerance and heat intolerance. Genitourinary: Negative for dysuria, frequency and urgency. Musculoskeletal: Negative for arthralgias and back pain. Skin: Negative for rash and wound. Neurological: Negative for syncope, numbness and headaches. Hematological: Negative. Psychiatric/Behavioral: Negative for agitation and sleep disturbance. The patient is not nervous/anxious.         Past Medical History:   Diagnosis Date    Hyperthyroidism      Past Surgical History:   Procedure Laterality Date     SECTION  2002    TONSILLECTOMY  2002     Social History     Socioeconomic History    Marital status: Single     Spouse name: Not on file    Number of children: Not on file    Years of education: Not on file    Highest education level: Not on file   Occupational History    Not on file   Social Needs    Financial resource strain: Not on file    Food insecurity     Worry: Not on file     Inability: Not on file    Transportation needs     Medical: Not on file     Non-medical: Not on file   Tobacco Use    Smoking status: Current Every Day Smoker     Packs/day: 0.10     Years: 10.00     Pack years: 1.00     Types: Cigarettes    Smokeless tobacco: Never Used   Substance and Sexual Activity    Alcohol use: Yes     Comment: drinks liquor socially just ever so often    Drug use: No    Sexual activity: Not on file   Lifestyle    Physical activity     Days per week: Not on file     Minutes per session: Not on file    Stress: Not on file   Relationships    Social connections     Talks on phone: Not on file     Gets together: Not on file     Attends Zoroastrianism service: Not on file     Active member of club or organization: Not on file     Attends meetings of clubs or organizations: Not on file     Relationship status: Not on file    Intimate partner violence     Fear of current or ex partner: Not on file     Emotionally abused: Not on file     Physically abused: Not on file     Forced sexual activity: Not on file   Other Topics Concern    Not on file   Social History Narrative    Not on file     No Known Allergies        Problem List     Morbid obesity with BMI of 45.0-49.9, adult ([facility] [address]    Relevant Medications    phentermine (ADIPEX-P) 37.5 MG tablet    Simple chronic bronchitis (HCC)    Relevant Medications    albuterol sulfate  (90 Base) MCG/ACT inhaler    Hypothyroidism    Relevant Medications    levothyroxine (SYNTHROID) 25 MCG tablet    Hypertension    Relevant Medications    hydroCHLOROthiazide (MICROZIDE) 12.5 MG capsule    Middle ear effusion, left    Relevant Medications    fluticasone (FLONASE) 50 MCG/ACT nasal spray    loratadine (CLARITIN) 10 MG tablet    Depression    Relevant Medications    sertraline (ZOLOFT) 50 MG tablet        Counseled on diet and exercise    I affirm this is a Patient Initiated Episode with a Patient who has not had a related appointment within my department in the past 7 days or scheduled within the next 24 hours.     Patient identification was verified at the start of the visit: Yes    Total Time: minutes: 11-20 minutes    Note: not billable if this call serves to triage the patient into an appointment for the relevant concern      Laureano Borja DO

## 2020-06-24 ENCOUNTER — HOSPITAL ENCOUNTER (OUTPATIENT)
Facility: HOSPITAL | Age: 40
Discharge: HOME | End: 2020-06-24
Payer: MEDICAID

## 2020-06-24 PROCEDURE — 84443 ASSAY THYROID STIM HORMONE: CPT

## 2020-06-24 PROCEDURE — 80053 COMPREHEN METABOLIC PANEL: CPT

## 2020-06-24 PROCEDURE — 84439 ASSAY OF FREE THYROXINE: CPT

## 2020-06-25 LAB
A/G RATIO: 1.4 (ref 0.8–2)
ALBUMIN SERPL-MCNC: 4.2 G/DL (ref 3.4–4.8)
ALP BLD-CCNC: 63 U/L (ref 25–100)
ALT SERPL-CCNC: 200 U/L (ref 4–36)
ANION GAP SERPL CALCULATED.3IONS-SCNC: 13 MMOL/L (ref 3–16)
AST SERPL-CCNC: 99 U/L (ref 8–33)
BILIRUB SERPL-MCNC: 0.4 MG/DL (ref 0.3–1.2)
BUN BLDV-MCNC: 12 MG/DL (ref 6–20)
CALCIUM SERPL-MCNC: 9.2 MG/DL (ref 8.5–10.5)
CHLORIDE BLD-SCNC: 98 MMOL/L (ref 98–107)
CO2: 25 MMOL/L (ref 20–30)
CREAT SERPL-MCNC: 0.8 MG/DL (ref 0.4–1.2)
GFR AFRICAN AMERICAN: >59
GFR NON-AFRICAN AMERICAN: >60
GLOBULIN: 3 G/DL
GLUCOSE BLD-MCNC: 90 MG/DL (ref 74–106)
POTASSIUM SERPL-SCNC: 4.2 MMOL/L (ref 3.4–5.1)
SODIUM BLD-SCNC: 136 MMOL/L (ref 136–145)
T4 FREE: 0.96 NG/DL (ref 0.89–1.76)
TOTAL PROTEIN: 7.2 G/DL (ref 6.4–8.3)
TSH REFLEX: 11.05 UIU/ML (ref 0.35–5.5)

## 2020-06-29 RX ORDER — LEVOTHYROXINE SODIUM 0.05 MG/1
50 TABLET ORAL DAILY
Qty: 30 TABLET | Refills: 5 | Status: SHIPPED | OUTPATIENT
Start: 2020-06-29 | End: 2021-01-20 | Stop reason: SDUPTHER

## 2020-07-22 ENCOUNTER — VIRTUAL VISIT (OUTPATIENT)
Dept: PRIMARY CARE CLINIC | Age: 40
End: 2020-07-22
Payer: MEDICAID

## 2020-07-22 PROCEDURE — 99213 OFFICE O/P EST LOW 20 MIN: CPT | Performed by: PEDIATRICS

## 2020-07-22 RX ORDER — PHENTERMINE HYDROCHLORIDE 37.5 MG/1
37.5 TABLET ORAL
Qty: 30 TABLET | Refills: 0 | Status: SHIPPED | OUTPATIENT
Start: 2020-07-22 | End: 2020-08-21

## 2020-07-22 NOTE — PROGRESS NOTES
Mamie Fish is a 37 y.o. female evaluated via telephone on 7/22/2020. Consent:  She and/or health care decision maker is aware that that she may receive a bill for this telephone service, depending on her insurance coverage, and has provided verbal consent to proceed: Yes      Documentation:  I communicated with the patient and/or health care decision maker about obesity. Details of this discussion including any medical advice provided:     She reports she has lost 10 lb, she says she has quit drinking pop. She reports she is doing well. She denies any side effects of the medications. Problem List     Morbid obesity with BMI of 45.0-49.9, adult (HCC)    Relevant Medications    phentermine (ADIPEX-P) 37.5 MG tablet            I affirm this is a Patient Initiated Episode with a Patient who has not had a related appointment within my department in the past 7 days or scheduled within the next 24 hours.     Patient identification was verified at the start of the visit: Yes    Total Time: minutes: 11-20 minutes    Note: not billable if this call serves to triage the patient into an appointment for the relevant concern      Petra Irby

## 2020-07-31 ASSESSMENT — ENCOUNTER SYMPTOMS
NAUSEA: 0
ABDOMINAL PAIN: 0
WHEEZING: 0
BACK PAIN: 0
COUGH: 0
SORE THROAT: 0
SHORTNESS OF BREATH: 0
SINUS PRESSURE: 0
EYE DISCHARGE: 0
VOMITING: 0

## 2020-07-31 ASSESSMENT — COPD QUESTIONNAIRES: COPD: 1

## 2020-08-23 ENCOUNTER — HOSPITAL ENCOUNTER (EMERGENCY)
Facility: HOSPITAL | Age: 40
Discharge: ANOTHER HOSPITAL | End: 2020-08-23
Attending: HOSPITALIST
Payer: MEDICAID

## 2020-08-23 VITALS
HEIGHT: 64 IN | DIASTOLIC BLOOD PRESSURE: 88 MMHG | RESPIRATION RATE: 18 BRPM | SYSTOLIC BLOOD PRESSURE: 115 MMHG | WEIGHT: 255 LBS | OXYGEN SATURATION: 97 % | HEART RATE: 95 BPM | TEMPERATURE: 98.3 F | BODY MASS INDEX: 43.54 KG/M2

## 2020-08-23 PROCEDURE — 90471 IMMUNIZATION ADMIN: CPT | Performed by: HOSPITALIST

## 2020-08-23 PROCEDURE — 96372 THER/PROPH/DIAG INJ SC/IM: CPT

## 2020-08-23 PROCEDURE — 99283 EMERGENCY DEPT VISIT LOW MDM: CPT

## 2020-08-23 PROCEDURE — 2580000003 HC RX 258: Performed by: HOSPITALIST

## 2020-08-23 PROCEDURE — 90715 TDAP VACCINE 7 YRS/> IM: CPT | Performed by: HOSPITALIST

## 2020-08-23 PROCEDURE — 96365 THER/PROPH/DIAG IV INF INIT: CPT

## 2020-08-23 PROCEDURE — 6360000002 HC RX W HCPCS: Performed by: HOSPITALIST

## 2020-08-23 RX ADMIN — TETANUS TOXOID, REDUCED DIPHTHERIA TOXOID AND ACELLULAR PERTUSSIS VACCINE, ADSORBED 0.5 ML: 5; 2.5; 8; 8; 2.5 SUSPENSION INTRAMUSCULAR at 13:33

## 2020-08-23 RX ADMIN — DEXTROSE MONOHYDRATE 1 G: 5 INJECTION INTRAVENOUS at 13:32

## 2020-08-23 NOTE — ED NOTES
Transfer discussed with pt. Pt aware that she will have to wait on EMS. Pt agreed to go POV and verbalized understanding to go straight to [facility] and to have nothing to eat or drink. Pt had no further questions or concerns.       Jordan Lora RN  08/23/20 1766

## 2020-08-23 NOTE — ED PROVIDER NOTES
[facility] ENCOUNTER      Pt Name: Josi Hardy  MRN: [id number]  YOB: 1980  Date of evaluation: 8/23/2020  Provider: Pilo Adam DO    CHIEF COMPLAINT       Chief Complaint   Patient presents with    Animal Bite         HISTORY OF PRESENT ILLNESS  (Location/Symptom, Timing/Onset, Context/Setting, Quality, Duration, Modifying Factors, Severity.)   Josi Hardy is a 37 y.o. female who presents to the emergency department for dog attack. Patient was at home with her dog and family. Apparently the dog was trying to \"ride\" someone else's leg there at the house and the owner told the dog to get down and stop and she states that he attacked her at that time. Patient states that the dog belongs to her she has had it ever since he was 7 months old. She knows of the dog having 1 rabies vaccination but did not know need to be yearly. Patient had multiple injuries and was tacked to both arms. Denies any loss of consciousness. Denies any weakness of the extremities out of ordinary at this time. She states that she just feels numb all over. Denies any other complaint except for the multiple large lacerations and puncture wounds to the patient's right upper arm, left elbow upper arm and left hand. Nursing notes were reviewed.     REVIEW OFSYSTEMS    (2-9 systems for level 4, 10 or more for level 5)   ROS:  General:  No fevers, no chills, no weakness  Cardiovascular:  No chest pain, no palpitations  Respiratory:  No shortness of breath, no cough, no wheezing  Gastrointestinal:  No pain, no nausea, no vomiting, no diarrhea  Musculoskeletal:  No muscle pain, no joint pain  Skin:  No rash, no easy bruising, +multiple dog bites to bilateral upper extemities  Neurologic:  No speech problems, no headache, no extremity weakness  Psychiatric:  No anxiety  Genitourinary:  No dysuria, no hematuria    Except as noted above the remainder of the review of systems was reviewed and negative. PAST MEDICAL HISTORY     Past Medical History:   Diagnosis Date    Hyperthyroidism          SURGICAL HISTORY       Past Surgical History:   Procedure Laterality Date     SECTION  2002    TONSILLECTOMY  2002         CURRENT MEDICATIONS       Previous Medications    ALBUTEROL SULFATE  (90 BASE) MCG/ACT INHALER    Inhale 2 puffs into the lungs every 4-6 hours as needed for Wheezing    FLUTICASONE (FLONASE) 50 MCG/ACT NASAL SPRAY    2 sprays by Each Nostril route daily    HYDROCHLOROTHIAZIDE (MICROZIDE) 12.5 MG CAPSULE    Take 1 capsule by mouth every morning    LEVOTHYROXINE (SYNTHROID) 50 MCG TABLET    Take 1 tablet by mouth daily    LORATADINE (CLARITIN) 10 MG TABLET    Take 1 tablet by mouth daily    SERTRALINE (ZOLOFT) 50 MG TABLET    Take 1 tablet by mouth daily       ALLERGIES     Patient has no known allergies.     FAMILY HISTORY       Family History   Problem Relation Age of Onset    Cancer Father         pt unsure of kind did not know father well          SOCIAL HISTORY       Social History     Socioeconomic History    Marital status: Single     Spouse name: None    Number of children: None    Years of education: None    Highest education level: None   Occupational History    None   Social Needs    Financial resource strain: None    Food insecurity     Worry: None     Inability: None    Transportation needs     Medical: None     Non-medical: None   Tobacco Use    Smoking status: Current Every Day Smoker     Packs/day: 0.10     Years: 10.00     Pack years: 1.00     Types: Cigarettes    Smokeless tobacco: Never Used   Substance and Sexual Activity    Alcohol use: Yes     Comment: drinks liquor socially just ever so often    Drug use: No    Sexual activity: None   Lifestyle    Physical activity     Days per week: None     Minutes per session: None    Stress: None   Relationships    Social connections     Talks on phone: None     Gets lacerations of bite with exposed adipose tissue noted to the right upper forearm area over the biceps. There is a large indentation noted there a concern for possible muscle tear or detachment. Psych: Mentation is grossly normal, cognition is grossly normal. Affect is appropriate. DIAGNOSTIC RESULTS     EKG: All EKG's are interpreted by the Emergency Department Physician who either signs or Co-signs this chart in the [address] a cardiologist.        RADIOLOGY:   Non-plain film images such as CT, Ultrasound and MRI are read by the radiologist. Plain radiographic images are visualized and preliminarily interpreted by the emergency physician with the below findings:      ? Radiologist's Report Reviewed:  No orders to display         ED BEDSIDE ULTRASOUND:   Performed by ED Physician - none    LABS:    I have reviewed and interpreted all of the currently available lab results from this visit (ifapplicable):  No results found for this visit on 08/23/20. All other labs were within normal range or not returned as of this dictation. EMERGENCY DEPARTMENT COURSE and DIFFERENTIAL DIAGNOSIS/MDM:   Vitals:    Vitals:    08/23/20 1312 08/23/20 1315 08/23/20 1330 08/23/20 1515   BP: (!) 158/121 (!) 158/121 (!) 136/99 115/88   Pulse: 107 108 107 95   Resp: 18      Temp: 98.3 °F (36.8 °C)      TempSrc: Axillary      SpO2: 97% 98% 98% 97%   Weight: 255 lb (115.7 kg)      Height: 5' 4\" (1.626 m)          MEDICATIONS ADMINISTERED IN ED:  Medications   ceFAZolin (ANCEF) 1 g in dextrose 5 % 50 mL IVPB (mini-bag) (0 g Intravenous Stopped 8/23/20 1429)   Tetanus-Diphth-Acell Pertussis (BOOSTRIX) injection 0.5 mL (0.5 mLs Intramuscular Given 8/23/20 1333)       After initial evaluation and examination I did have a conversation with the patient about the upcoming plan, treatment and possible disposition which they were agreeable to the time this dictation.   Patient advised that we give her a dose of Ancef 1 g IV and the tetanus shot.  Patient does have multiple areas of lacerations which I believe are more than what we can handle here especially with injury to the hands and arms from the dog bite she does need to be seen by trauma and most likely plastics. This was discussed with the patient initially she was going to go by private vehicle but then decided that she would go by EMS. Patient will have the IV medication provided here along with a tetanus booster. Discussed with the patient about the rabies vaccination and immunoglobulin and she elected not to have that performed here and states that she would rather discuss that with the [address] [place] [address] when she gets there. Case was discussed with Dr. Esteban Whitman trauma surgeon on-call [address] [place] [address] and she did agree to accept the patient in transfer there especially since she has hand and arm injury because she may need to be followed by plastic surgery. Advised that she did receive the Ancef and a tetanus here. She is aware the patient is sort of on the fence about rabies vaccination. Apparently the  is already killed the dog. Patient be transferred to [address] [place] [address] as a trauma for further evaluation work-up. After a sitting here in the emergency department for almost 3 hours waiting on transport to [address] [place] [address] the patient kept getting delayed secondary to emergent runs. Patient has now decided that she would go by POV again. Patient's IV will be removed. She will be given all the information which was provided in her charting to present to the registration at the [address] [place] [address] to let them know that she was a transfer from [facility] to the emergency department there at the [facility]. Patient does state her understanding. Appropriate paperwork was signed.              CONSULTS:  None    PROCEDURES:  Procedures    CRITICAL CARE TIME    Total Critical Care time was 0 minutes, excluding separately reportable procedures. There was a high probability of clinically significant/life threatening deterioration in the patient's condition which required my urgent intervention. FINAL IMPRESSION      1. Dog bite of arm, left, initial encounter    2. Dog bite of right upper arm, initial encounter    3. Dog bite of left hand, initial encounter          DISPOSITION/PLAN   DISPOSITION        PATIENT REFERRED TO:  No follow-up provider specified. DISCHARGE MEDICATIONS:  New Prescriptions    No medications on file       Comment: Please note this report has been produced using speech recognition software and may contain errorsrelated to that system including errors in grammar, punctuation, and spelling, as well as words and phrases that may be inappropriate. If there are any questions or concerns please feel free to contact the dictating providerfor clarification.     Afshin Blakely, DO  Attending Emergency Physician              Castillo Gilmore, DO  08/23/20 Yung Louie, DO  08/23/20 8573

## 2020-08-23 NOTE — ED NOTES
Report given to [facility] at this time.       Melquiades Gonzalez RN  08/23/20 6757

## 2020-08-23 NOTE — ED NOTES
Pt had wet to dry dressing applied at this time. Pt tolerated well with no complaints.       Medardo Haley RN  08/23/20 5359

## 2020-08-30 ENCOUNTER — HOSPITAL ENCOUNTER (EMERGENCY)
Facility: HOSPITAL | Age: 40
Discharge: HOME | End: 2020-08-30
Attending: HOSPITALIST
Payer: MEDICAID

## 2020-08-30 VITALS
BODY MASS INDEX: 43.54 KG/M2 | RESPIRATION RATE: 16 BRPM | WEIGHT: 255 LBS | SYSTOLIC BLOOD PRESSURE: 162 MMHG | DIASTOLIC BLOOD PRESSURE: 118 MMHG | OXYGEN SATURATION: 98 % | TEMPERATURE: 98.7 F | HEART RATE: 99 BPM | HEIGHT: 64 IN

## 2020-08-30 PROCEDURE — 99282 EMERGENCY DEPT VISIT SF MDM: CPT

## 2020-08-30 ASSESSMENT — PAIN DESCRIPTION - DESCRIPTORS: DESCRIPTORS: THROBBING

## 2020-08-30 ASSESSMENT — PAIN DESCRIPTION - PAIN TYPE: TYPE: ACUTE PAIN

## 2020-08-30 ASSESSMENT — PAIN DESCRIPTION - FREQUENCY: FREQUENCY: CONTINUOUS

## 2020-08-30 ASSESSMENT — PAIN SCALES - GENERAL: PAINLEVEL_OUTOF10: 4

## 2020-08-30 ASSESSMENT — PAIN DESCRIPTION - ORIENTATION: ORIENTATION: LEFT

## 2020-08-30 ASSESSMENT — PAIN DESCRIPTION - LOCATION: LOCATION: ARM

## 2020-08-30 NOTE — ED NOTES
New dressings applied to pts right arm and left arm. Steri Strips applied to pts left 3rd digit where sutures were removed per Dr. Morro Benson.       Alec Rodriguez RN  08/30/20 0058

## 2020-08-30 NOTE — ED NOTES
Patient presents to Ed with a wound she got when a dog attacked her 7 days ago. Patient states she was worried about the way the wound looked and she wanted to check to see if her stitches were ready to be removed. She denies any symptoms of infection. Markos Salcedo, nursing student.      Erika Hansen RN  08/30/20 0062

## 2020-08-30 NOTE — ED PROVIDER NOTES
[facility] ENCOUNTER      Pt Name: Jack Bullock  MRN: [id number]  YOB: 1980  Date of evaluation: 8/30/2020  Provider: Olga Benavides [address] [place] [address]       Chief Complaint   Patient presents with    Wound Check         HISTORY OF PRESENT ILLNESS  (Location/Symptom, Timing/Onset, Context/Setting, Quality, Duration, Modifying Factors, Severity.)   Jack Bullock is a 37 y.o. female who presents to the emergency department for wound check and possible suture removal.  Patient was evaluated by me here on 8/23/2020 and was subsequently transferred onto [facility] because he had she had been mauled by her a pit bull. Patient had sustained 2 lacerations to the posterior aspect of her left arm around her elbow. She had laceration to the left middle finger and then also had mauling with multiple lacerations and puncture wounds to the right upper arm. Patient was evaluated in [facility] had wound care provided with sutures and was discharged home. She has been taking antibiotics she believes it is Augmentin that she is not very sure. Patient states that she has 1 more days worth of this. She states that they advised her to come to the ER in 7 days to have them evaluated to see if her sutures could possibly be removed to make sure that her wounds were healing okay. Patient denies any other complaints. Denies any fevers or chills. Nursing notes were reviewed.     REVIEW OFSYSTEMS    (2-9 systems for level 4, 10 or more for level 5)   ROS:  General:  No fevers, no chills, no weakness  Cardiovascular:  No chest pain, no palpitations  Respiratory:  No shortness of breath, no cough, no wheezing  Gastrointestinal:  No pain, no nausea, no vomiting, no diarrhea  Musculoskeletal:  No muscle pain, no joint pain  Skin:  No rash, no easy bruising, + ultimately healing wounds of the bilateral upper extremities  Neurologic:  No speech  Physical activity     Days per week: None     Minutes per session: None    Stress: None   Relationships    Social connections     Talks on phone: None     Gets together: None     Attends Congregation service: None     Active member of club or organization: None     Attends meetings of clubs or organizations: None     Relationship status: None    Intimate partner violence     Fear of current or ex partner: None     Emotionally abused: None     Physically abused: None     Forced sexual activity: None   Other Topics Concern    None   Social History Narrative    None         PHYSICAL EXAM    (up to 7 for level 4, 8 or more for level 5)     ED Triage Vitals [08/30/20 1440]   BP Temp Temp Source Pulse Resp SpO2 Height Weight   (!) 189/117 98.7 °F (37.1 °C) Oral 101 16 98 % 5' 4\" (1.626 m) 255 lb (115.7 kg)       Physical Exam  General :Patient is awake, alert, oriented, in no acute distress, nontoxic appearing  HEENT: Pupils are equally round and reactive to light, EOMI, conjunctivae clear. Abdomen: Abdomen is soft, nontender, nondistended. There is no firm or pulsatile masses, no rebound rigidity or guarding. Musculoskeletal: 5 out of 5 strength in all 4 extremities. No focal muscle deficits are appreciated  Neuro: Motor intact, sensory intact, level of consciousness is normal  Dermatology: Skin is warm and dry, patient had a laceration of her left middle finger closed at [facility] this is on the pad surface of the finger. This was ready to have sutures removed she had 5 sutures removed from that area and then Steri-Strips placed. She has 2 larger lacerations on the posterior aspect of her left upper arm one above and one below her elbow these lacerations are not ready to have sutures removed at this time recommended that she probably wait for a total of 10 to 14 days to have these removed.   The large aspect on her right upper arm with the mottling and multiple lacerations has 3 lacerations closed with suture none of these are ready to be removed at this time again advised patient wait for a total of 10 to 14days. As far as the wound itself there is some induration and redness to the entire wound but it is only to the area of the dog bite is not expanded beyond that point. There is granulation and eschar over the mauling area there is no drainage or discharge it does look to be healing rather well. bloody drainage noted from the laceration on the upper posterior aspect of her left arm which is actually consistent with a seroma drainage and no active bleeding all sutures are intact  Psych: Mentation is grossly normal, cognition is grossly normal. Affect is appropriate. DIAGNOSTIC RESULTS     EKG: All EKG's are interpreted by the Emergency Department Physician who either signs or Co-signs this chart in the [address] a cardiologist.      RADIOLOGY:   Non-plain film images such as CT, Ultrasound and MRI are read by the radiologist. Plain radiographic images are visualized and preliminarily interpreted by the emergency physician with the below findings:      ? Radiologist's Report Reviewed:  No orders to display         ED BEDSIDE ULTRASOUND:   Performed by ED Physician - none    LABS:    I have reviewed and interpreted all of the currently available lab results from this visit (ifapplicable):  No results found for this visit on 08/30/20. All other labs were within normal range or not returned as of this dictation.     EMERGENCY DEPARTMENT COURSE and DIFFERENTIAL DIAGNOSIS/MDM:   Vitals:    Vitals:    08/30/20 1440   BP: (!) 189/117   Pulse: 101   Resp: 16   Temp: 98.7 °F (37.1 °C)   TempSrc: Oral   SpO2: 98%   Weight: 255 lb (115.7 kg)   Height: 5' 4\" (1.626 m)       MEDICATIONS ADMINISTERED IN ED:  Medications - No data to display    After initial evaluation and examination I did have a conversation with the patient about the upcoming plan, treatment and possible disposition which they were agreeable to the time of this dictation. Patient advised that the sutures of her finger can easily be removed and then we would place some Steri-Strips over that area and then have some dressing placed over that. I did go over appropriate cleaning of the area. As far as her other multiple lacerations those sutures are not ready to come out at this time. She does need to wait a total of probably 10 to 14 days since placement before they were attempted to be removed. She did have an area of bleeding noted to the upper laceration on the left posterior arm but this was actually just drainage from a seroma present sutures are still intact. Patient will be discharged home in stable condition advised to continue with the medications as prescribed by the [facility]. Patient states that she will try to follow-up with her family doctor on Wednesday if she is unable to do so then she may return back here for evaluation at that time. Discharged home in stable condition    The patient advised that she does need to follow-up with her regular family physician within the next 1 to 2 days reevaluation. She is also given instructions that if her symptoms worsens or new symptoms arise she should return back to the emergency department for further evaluation work-up. CONSULTS:  None    PROCEDURES:  Procedures    CRITICAL CARE TIME    Total Critical Care time was 0 minutes, excluding separately reportable procedures. There was a high probability of clinically significant/life threatening deterioration in the patient's condition which required my urgent intervention. FINAL IMPRESSION      1. Encounter for post-traumatic wound check    2. Visit for suture removal          DISPOSITION/PLAN   DISPOSITION        PATIENT REFERRED TO:  Lesia Lesch, APRN  [address]  [phone]    In 2 days      [facility]  [place] [address]..   [facility]  [phone]    As

## 2020-09-02 ENCOUNTER — NURSE ONLY (OUTPATIENT)
Dept: PRIMARY CARE CLINIC | Age: 40
End: 2020-09-02

## 2020-09-02 NOTE — PROGRESS NOTES
Patient here for suture removal. Sutures to both arms from animal bite. Wounds checked by Fatoumata Ye, patient was instructed on aftercare. Sutures removed. Left arm wounds were steri-stripped. Patient tolerated well. Advised to contact the office with any concerns or questions. Patient verbally understood.

## 2020-09-10 ENCOUNTER — NURSE TRIAGE (OUTPATIENT)
Dept: OTHER | Facility: CLINIC | Age: 40
End: 2020-09-10

## 2020-09-10 NOTE — TELEPHONE ENCOUNTER
Patient called [place] pre-[facility]) to schedule appointment with red flag complaint; transferred to Nurse Access for triage. Reason for Disposition   [1] Wound gaping open after sutures (or staples) AND [2] > 48 hours since wound re-opened AND [3] length of opening > 1/2 inch (12 mm)    Answer Assessment - Initial Assessment Questions  1. LOCATION: \"Where are the sutures (or staples) located? \"       Right arm and left arm  2. NUMBER: \"How many sutures (or staples) are there? \"       Removed last week  3. DATE IN: \"When were the sutures (or staples) put in?\"          4. DATE OUT: \"When did your doctor tell you the sutures (or staples) needed to come out? \"      Last week  5. OTHER SYMPTOMS: \"Do you have any other symptoms? \" (e.g., wound pain, discharge, fever?)      Drainage (yellow on one arm and yellow and red on one that is odorous) and odor, especially at night  6. PREGNANCY: \"Is there any chance you are pregnant? \" \"When was your last menstrual period? \"      No    Protocols used: SUTURE OR STAPLE QUESTIONS-ADULT-AH    Informed of disposition. Care advice as documented. Instructed to call back with worsening symptoms. [facility] as recommended. Please do not respond to the triage nurse through this encounter. Any subsequent communication should be directly with the patient.

## 2020-09-11 ENCOUNTER — HOSPITAL ENCOUNTER (EMERGENCY)
Facility: HOSPITAL | Age: 40
Discharge: HOME | End: 2020-09-11
Attending: EMERGENCY MEDICINE
Payer: MEDICAID

## 2020-09-11 VITALS
WEIGHT: 255 LBS | HEART RATE: 94 BPM | SYSTOLIC BLOOD PRESSURE: 172 MMHG | DIASTOLIC BLOOD PRESSURE: 95 MMHG | RESPIRATION RATE: 22 BRPM | OXYGEN SATURATION: 97 % | TEMPERATURE: 97.9 F | BODY MASS INDEX: 43.54 KG/M2 | HEIGHT: 64 IN

## 2020-09-11 LAB
ANION GAP SERPL CALCULATED.3IONS-SCNC: 10 MMOL/L (ref 3–16)
BASOPHILS ABSOLUTE: 0.1 K/UL (ref 0–0.1)
BASOPHILS RELATIVE PERCENT: 0.6 %
BUN BLDV-MCNC: 16 MG/DL (ref 6–20)
CALCIUM SERPL-MCNC: 9.5 MG/DL (ref 8.5–10.5)
CHLORIDE BLD-SCNC: 103 MMOL/L (ref 98–107)
CO2: 23 MMOL/L (ref 20–30)
CREAT SERPL-MCNC: 0.8 MG/DL (ref 0.4–1.2)
EOSINOPHILS ABSOLUTE: 0.1 K/UL (ref 0–0.4)
EOSINOPHILS RELATIVE PERCENT: 1.5 %
GFR AFRICAN AMERICAN: >59
GFR NON-AFRICAN AMERICAN: >60
GLUCOSE BLD-MCNC: 109 MG/DL (ref 74–106)
HCT VFR BLD CALC: 43.8 % (ref 37–47)
HEMOGLOBIN: 14.3 G/DL (ref 11.5–16.5)
IMMATURE GRANULOCYTES #: 0.1 K/UL
IMMATURE GRANULOCYTES %: 0.6 % (ref 0–5)
LYMPHOCYTES ABSOLUTE: 2.2 K/UL (ref 1.5–4)
LYMPHOCYTES RELATIVE PERCENT: 27.8 %
MCH RBC QN AUTO: 29.8 PG (ref 27–32)
MCHC RBC AUTO-ENTMCNC: 32.6 G/DL (ref 31–35)
MCV RBC AUTO: 91.3 FL (ref 80–100)
MONOCYTES ABSOLUTE: 0.5 K/UL (ref 0.2–0.8)
MONOCYTES RELATIVE PERCENT: 5.8 %
NEUTROPHILS ABSOLUTE: 5.1 K/UL (ref 2–7.5)
NEUTROPHILS RELATIVE PERCENT: 63.7 %
PDW BLD-RTO: 13.1 % (ref 11–16)
PLATELET # BLD: 374 K/UL (ref 150–400)
PMV BLD AUTO: 9.2 FL (ref 6–10)
POTASSIUM REFLEX MAGNESIUM: 4.2 MMOL/L (ref 3.4–5.1)
RBC # BLD: 4.8 M/UL (ref 3.8–5.8)
SODIUM BLD-SCNC: 136 MMOL/L (ref 136–145)
WBC # BLD: 8.1 K/UL (ref 4–11)

## 2020-09-11 PROCEDURE — 87186 SC STD MICRODIL/AGAR DIL: CPT

## 2020-09-11 PROCEDURE — 87070 CULTURE OTHR SPECIMN AEROBIC: CPT

## 2020-09-11 PROCEDURE — 85025 COMPLETE CBC W/AUTO DIFF WBC: CPT

## 2020-09-11 PROCEDURE — 87077 CULTURE AEROBIC IDENTIFY: CPT

## 2020-09-11 PROCEDURE — 87205 SMEAR GRAM STAIN: CPT

## 2020-09-11 PROCEDURE — 36415 COLL VENOUS BLD VENIPUNCTURE: CPT

## 2020-09-11 PROCEDURE — 87075 CULTR BACTERIA EXCEPT BLOOD: CPT

## 2020-09-11 PROCEDURE — 99283 EMERGENCY DEPT VISIT LOW MDM: CPT

## 2020-09-11 PROCEDURE — 99282 EMERGENCY DEPT VISIT SF MDM: CPT

## 2020-09-11 PROCEDURE — 80048 BASIC METABOLIC PNL TOTAL CA: CPT

## 2020-09-11 RX ORDER — DOXYCYCLINE 100 MG/1
100 TABLET ORAL 2 TIMES DAILY
Qty: 20 TABLET | Refills: 0 | Status: SHIPPED | OUTPATIENT
Start: 2020-09-11 | End: 2020-09-21

## 2020-09-11 RX ORDER — GREEN TEA/HOODIA GORDONII 315-12.5MG
1 CAPSULE ORAL DAILY
Qty: 30 TABLET | Refills: 0 | Status: SHIPPED | OUTPATIENT
Start: 2020-09-11 | End: 2020-10-11

## 2020-09-11 RX ORDER — CLINDAMYCIN HYDROCHLORIDE 150 MG/1
450 CAPSULE ORAL 3 TIMES DAILY
Qty: 90 CAPSULE | Refills: 0 | Status: SHIPPED | OUTPATIENT
Start: 2020-09-11 | End: 2020-09-21

## 2020-09-11 NOTE — ED NOTES
AVS and RX discussed with and verbally acknowledged by patient. No further needs or concerns voiced at this time.  [employer] Student Nurse     Sancho Noyola RN  09/11/20 4320

## 2020-09-11 NOTE — ED PROVIDER NOTES
[facility] ENCOUNTER      Pt Name: Manas Stanford  MRN: [id number]  [place] 1980  Date of evaluation: 2020  Provider: Deepti Mullins DO    CHIEF COMPLAINT       Chief Complaint   Patient presents with    Other         HISTORY OF PRESENT ILLNESS   (Location/Symptom, Timing/Onset, Context/Setting, Quality, Duration, Modifying Factors, Severity)  Note limiting factors. Manas Stanford is a 37 y.o. female who presents to the emergency department for evaluation of a wound. Patient was attacked by a dog on . Had lacerations of the left arm, left hand as well as right upper arm. She was transferred to [address] [place] [address] and there was evaluated and sutures were placed. She was given antibiotics to go home on which she believes are Augmentin. She was seen again on  here in the emergency department had the sutures removed but the wound was still healing. She then was seen on  to have the sutures removed and Steri-Strips were placed. She reports since that time the right upper extremity wound has started having some yellow drainage and a foul odor from it. Reports pain in the area. Denies fever, night sweats or chills. Denies any numbness weakness or tingling distally. Is no longer on antibiotic therapy. Appropriate PPE was used including an eye shield, gloves, N95 mask, surgical mask during the entire patient encounter and exam.  If necessary (pt being intubated or aerosolizing equipment in use) a gown was also used. Nursing Notes were reviewed.       PAST MEDICAL HISTORY     Past Medical History:   Diagnosis Date    Hyperthyroidism          SURGICAL HISTORY       Past Surgical History:   Procedure Laterality Date     SECTION  2002    TONSILLECTOMY  2002         CURRENT MEDICATIONS       Previous Medications    FLUTICASONE (FLONASE) 50 MCG/ACT NASAL SPRAY    2 sprays by Each Nostril route daily    HYDROCHLOROTHIAZIDE (MICROZIDE) 12.5 MG CAPSULE    Take 1 capsule by mouth every morning    LEVOTHYROXINE (SYNTHROID) 50 MCG TABLET    Take 1 tablet by mouth daily    LORATADINE (CLARITIN) 10 MG TABLET    Take 1 tablet by mouth daily    SERTRALINE (ZOLOFT) 50 MG TABLET    Take 1 tablet by mouth daily       ALLERGIES     Patient has no known allergies.     FAMILY HISTORY       Family History   Problem Relation Age of Onset    Cancer Father         pt unsure of kind did not know father well          SOCIAL HISTORY       Social History     Socioeconomic History    Marital status: Single     Spouse name: None    Number of children: None    Years of education: None    Highest education level: None   Occupational History    None   Social Needs    Financial resource strain: None    Food insecurity     Worry: None     Inability: None    Transportation needs     Medical: None     Non-medical: None   Tobacco Use    Smoking status: Current Every Day Smoker     Packs/day: 0.10     Years: 10.00     Pack years: 1.00     Types: Cigarettes    Smokeless tobacco: Never Used   Substance and Sexual Activity    Alcohol use: Yes     Comment: drinks liquor socially just ever so often    Drug use: No    Sexual activity: None   Lifestyle    Physical activity     Days per week: None     Minutes per session: None    Stress: None   Relationships    Social connections     Talks on phone: None     Gets together: None     Attends Congregation service: None     Active member of club or organization: None     Attends meetings of clubs or organizations: None     Relationship status: None    Intimate partner violence     Fear of current or ex partner: None     Emotionally abused: None     Physically abused: None     Forced sexual activity: None   Other Topics Concern    None   Social History Narrative    None       SCREENINGS               Review of Systems  Constitutional:  Denies fever, chills  Eyes: denies eye problems  HEENT: denies sore throat or ear pain  Respiratory: denies cough or shortness of breath  Cardiovascular: denies chest pain, palpitations  GI: denies abdominal pain, nausea, vomiting, or diarrhea  Musculoskeletal: denies joint pain  Skin: Opening and drainage of right upper extremity wound  Neurologic: denies focal weakness or sensory changes    Except as noted above the remainder of the review of systems was reviewed and negative. PHYSICAL EXAM    (up to 7 for level 4, 8 or more for level 5)     ED Triage Vitals   BP Temp Temp src Pulse Resp SpO2 Height Weight   -- -- -- -- -- -- -- --       General appearance: well-developed, well-nourished, no acute distress, nontoxic appearance  HENT: normocephalic, atraumatic, oropharynx moist, nares patent  Eyes: PERRLA, EOMI, conjunctiva normal  Neck: normal range of motion, no tenderness, trachea midline, no stridor  Respiratory: normal breath sounds, non labored breathing pattern  Cardiovascular: normal heart rate, normal rhythm  GI: nontender, bowel sounds normal, soft, nondistended, no pulsatile masses  Musculoskeletal: no edema, intact distal pulses, no clubbing, cyanosis. Good range of motion  Integument: Left upper extremity with well approximated wounds, there is some mild swelling and induration around the area but no overlying erythema, no tenderness to palpation, no overlying erythema of the elbow joint, full range of motion with flexion extension of the left elbow.   The right upper extremity on the lateral portion has an open wound healing by secondary intention, there is overlying granulation tissue, small areas of dehiscence in the center of the wound measuring less than half a centimeter each, there is serous drainage but no signs of purulent drainage, no fluctuance, no surrounding erythema, minimal tenderness to palpation,, no rash, < 2 second cap refill  Neurologic: alert and oriented ×3, no focal deficits appreciated        RADIOLOGY: Interpretation per the Radiologist below, if available at the time of this note:    No orders to display         LABS:  [address] TO MG FOR LOW K - Abnormal; Notable for the following components:       Result Value    Glucose 109 (*)     All other components within normal limits    Narrative:     Performed at:  [address] [place] [address] Laboratory  [address] [place], [place] [address], [place] [address]   Phone [phone]   CULTURE, ANAEROBIC AND AEROBIC   CBC WITH AUTO DIFFERENTIAL    Narrative:     Performed at:  [address] [place] [address] Laboratory  [address] [place] [address]  [place], [place] [address]   Phone [phone]       All other labs were within normal range or not returned as of this dictation. EMERGENCY DEPARTMENT COURSE and DIFFERENTIAL DIAGNOSIS/MDM:   Vitals:    Vitals:    09/11/20 1230   BP: (!) 172/95   Pulse: 94   Resp: 22   Temp: 97.9 °F (36.6 °C)   TempSrc: Oral   SpO2: 97%   Weight: 255 lb (115.7 kg)   Height: 5' 4\" (1.626 m)         MDM  57-year-old female presents the emergency department with increased drainage and foul odor from the right upper extremity wound status post dog bite. Vital signs stable. Physical exam as above. She appears alert and awake and nontoxic. She is neurovascular intact in the right upper extremity. There is a wound healing by secondary intention with granulation tissue and small areas of dehiscence with serous drainage. No sign of purulent drainage, abscess, necrotizing fasciitis. There is minimal surrounding erythema but no obvious signs of cellulitis. Baseline blood work shows normal electrolytes, normal CBC, no leukocytosis. Wound culture obtained. .  Patient does not appear septic at this time. Will discharge home patient on Doxy and clindamycin which is an alternative to Augmentin for dog bites.   We will also cover for MRSA, staph and other possible causes of a secondary cellulitis or infection. Instructed to follow-up with her primary care doctor in 1 to 2 days and will also give general surgery follow-up for wound evaluation. Patient agrees with discharge plan. Return precautions given if she has worsening pain, fever or any new or concerning symptoms arise. CONSULTS:  None      FINAL IMPRESSION      1.  Open wound of right upper arm, initial encounter          DISPOSITION/PLAN   DISPOSITION Decision To Discharge 09/11/2020 01:15:37 PM      PATIENT REFERRED TO:  Lorna Carey, APRN  [address]  [phone]    Schedule an appointment as soon as possible for a visit in 2 days  For wound re-check    Antonio Ritter MD  [facility]  [phone]    Schedule an appointment as soon as possible for a visit in 2 days  As needed, If symptoms worsen      DISCHARGE MEDICATIONS:  New Prescriptions    CLINDAMYCIN (CLEOCIN) 150 MG CAPSULE    Take 3 capsules by mouth 3 times daily for 10 days    DOXYCYCLINE MONOHYDRATE (ADOXA) 100 MG TABLET    Take 1 tablet by mouth 2 times daily for 10 days    PROBIOTIC ACIDOPHILUS (FLORANEX) TABS    Take 1 tablet by mouth daily          (Please note that portions of this note were completed with a voice recognition program.  Efforts were made to edit the dictations but occasionally words are mis-transcribed.)    Roberta Duong DO (electronically signed)  Attending Emergency Physician      Roberta Duong DO  09/11/20 2100

## 2020-09-11 NOTE — ED TRIAGE NOTES
Patient states she was bitten by a dog 3 weeks ago. Was transferred to [facility] for evaluation, sent home with abx for a week. She states today she is worried that the wound maybe infected. She notices a foul smell, denies injury.

## 2020-09-14 ENCOUNTER — OFFICE VISIT (OUTPATIENT)
Dept: SURGERY | Facility: CLINIC | Age: 40
End: 2020-09-14

## 2020-09-14 VITALS
BODY MASS INDEX: 45.75 KG/M2 | HEART RATE: 81 BPM | WEIGHT: 268 LBS | OXYGEN SATURATION: 98 % | SYSTOLIC BLOOD PRESSURE: 120 MMHG | DIASTOLIC BLOOD PRESSURE: 80 MMHG | HEIGHT: 64 IN
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
## 2020-09-14 DIAGNOSIS — S41.159A DOG BITE OF UPPER EXTREMITY, UNSPECIFIED LATERALITY, INITIAL ENCOUNTER: Primary | ICD-10-CM

## 2020-09-14 PROCEDURE — 11042 DBRDMT SUBQ TIS 1ST 20SQCM/<: CPT | Performed by: SURGERY

## 2020-09-14 PROCEDURE — 99203 OFFICE O/P NEW LOW 30 MIN: CPT | Performed by: SURGERY

## 2020-09-14 RX ORDER — LORATADINE 10 MG/1
10 TABLET ORAL DAILY
COMMUNITY
Start: 2020-07-10

## 2020-09-14 RX ORDER — DOXYCYCLINE 100 MG/1
100 TABLET ORAL 2 TIMES DAILY
COMMUNITY
Start: 2020-09-11

## 2020-09-14 RX ORDER — OMEPRAZOLE 40 MG/1
CAPSULE, DELAYED RELEASE ORAL
COMMUNITY
Start: 2020-08-24

## 2020-09-14 RX ORDER — HYDROCHLOROTHIAZIDE 12.5 MG/1
CAPSULE, GELATIN COATED ORAL
COMMUNITY
Start: 2020-07-10

## 2020-09-14 RX ORDER — ALBUTEROL SULFATE 90 UG/1
AEROSOL, METERED RESPIRATORY (INHALATION)
COMMUNITY
Start: 2020-07-10

## 2020-09-14 RX ORDER — LEVOTHYROXINE SODIUM 0.03 MG/1
25 TABLET ORAL DAILY
COMMUNITY
Start: 2020-08-24

## 2020-09-14 RX ORDER — FLUTICASONE PROPIONATE 50 MCG
SPRAY, SUSPENSION (ML) NASAL
COMMUNITY
Start: 2020-07-10

## 2020-09-14 RX ORDER — CLINDAMYCIN HYDROCHLORIDE 150 MG/1
CAPSULE ORAL
COMMUNITY
Start: 2020-09-11

## 2020-09-14 RX ORDER — GREEN TEA/HOODIA GORDONII 315-12.5MG
1 CAPSULE ORAL
COMMUNITY
Start: 2020-09-11 | End: 2020-10-11

## 2020-09-14 RX ORDER — LEVOTHYROXINE SODIUM 0.05 MG/1
50 TABLET ORAL
COMMUNITY
Start: 2020-06-29

## 2020-09-16 LAB
ANAEROBIC CULTURE: ABNORMAL
GRAM STAIN RESULT: ABNORMAL
ORGANISM: ABNORMAL
WOUND/ABSCESS: ABNORMAL

## 2021-01-20 ENCOUNTER — VIRTUAL VISIT (OUTPATIENT)
Dept: PRIMARY CARE CLINIC | Age: 41
End: 2021-01-20
Payer: MEDICAID

## 2021-01-20 DIAGNOSIS — E66.01 CLASS 3 SEVERE OBESITY DUE TO EXCESS CALORIES WITHOUT SERIOUS COMORBIDITY WITH BODY MASS INDEX (BMI) OF 40.0 TO 44.9 IN ADULT (HCC): ICD-10-CM

## 2021-01-20 DIAGNOSIS — I10 HYPERTENSION, UNSPECIFIED TYPE: ICD-10-CM

## 2021-01-20 DIAGNOSIS — F32.A DEPRESSION, UNSPECIFIED DEPRESSION TYPE: ICD-10-CM

## 2021-01-20 DIAGNOSIS — H65.92 MIDDLE EAR EFFUSION, LEFT: ICD-10-CM

## 2021-01-20 DIAGNOSIS — Z13.1 ENCOUNTER FOR SCREENING FOR DIABETES MELLITUS: Primary | ICD-10-CM

## 2021-01-20 PROCEDURE — 99214 OFFICE O/P EST MOD 30 MIN: CPT | Performed by: NURSE PRACTITIONER

## 2021-01-20 RX ORDER — LEVOTHYROXINE SODIUM 0.05 MG/1
50 TABLET ORAL DAILY
Qty: 30 TABLET | Refills: 5 | Status: SHIPPED | OUTPATIENT
Start: 2021-01-20

## 2021-01-20 RX ORDER — SERTRALINE HYDROCHLORIDE 100 MG/1
100 TABLET, FILM COATED ORAL DAILY
Qty: 30 TABLET | Refills: 3 | Status: SHIPPED | OUTPATIENT
Start: 2021-01-20 | End: 2022-09-22

## 2021-01-20 RX ORDER — HYDROCHLOROTHIAZIDE 12.5 MG/1
12.5 CAPSULE, GELATIN COATED ORAL EVERY MORNING
Qty: 30 CAPSULE | Refills: 3 | Status: SHIPPED | OUTPATIENT
Start: 2021-01-20 | End: 2022-09-22 | Stop reason: ALTCHOICE

## 2021-01-20 RX ORDER — PHENTERMINE HYDROCHLORIDE 37.5 MG/1
37.5 CAPSULE ORAL EVERY MORNING
Qty: 30 CAPSULE | Refills: 0 | Status: SHIPPED | OUTPATIENT
Start: 2021-01-20 | End: 2021-02-19

## 2021-01-20 RX ORDER — OMEPRAZOLE 40 MG/1
CAPSULE, DELAYED RELEASE ORAL
COMMUNITY
Start: 2020-12-12 | End: 2021-04-29 | Stop reason: SDUPTHER

## 2021-01-20 RX ORDER — FLUTICASONE PROPIONATE 50 MCG
2 SPRAY, SUSPENSION (ML) NASAL DAILY
Qty: 3 BOTTLE | Refills: 3 | Status: SHIPPED | OUTPATIENT
Start: 2021-01-20

## 2021-01-20 RX ORDER — LORATADINE 10 MG/1
10 TABLET ORAL DAILY
Qty: 30 TABLET | Refills: 3 | Status: SHIPPED | OUTPATIENT
Start: 2021-01-20

## 2021-01-20 ASSESSMENT — ENCOUNTER SYMPTOMS
RESPIRATORY NEGATIVE: 1
GASTROINTESTINAL NEGATIVE: 1

## 2021-01-21 NOTE — PROGRESS NOTES
SUBJECTIVE:    Patient ID: Swapna Mcneill is a 41 y.o. female. Chief Complaint   Patient presents with    Medication Management    Hypothyroidism    Depression    Weight Management   Video visit due to Covid Pandemic guidelines. HPI:  She is needing refills on her current medication. She has been depressed every since she had to put her dog down after he attacked her. She got a new dog but she says she spends a lot of time alone. She is interested in increasing her depression medication. She has gained weight. She says she weighs 280 pounds. She weighed 255 about 4 months ago. She says he has been taking her thyroid medication and blood pressure medication without any problems. Patient's medications,allergies, past medical, surgical, social and family histories were reviewed and updated as appropriate. .  Current Outpatient Medications on File Prior to Visit   Medication Sig Dispense Refill    omeprazole (PRILOSEC) 40 MG delayed release capsule TAKE 1 CAPSULE BY MOUTH EVERY DAY AS DIRECTED      levothyroxine (SYNTHROID) 50 MCG tablet Take 1 tablet by mouth daily 30 tablet 5    hydroCHLOROthiazide (MICROZIDE) 12.5 MG capsule Take 1 capsule by mouth every morning 30 capsule 3    fluticasone (FLONASE) 50 MCG/ACT nasal spray 2 sprays by Each Nostril route daily 3 Bottle 3    loratadine (CLARITIN) 10 MG tablet Take 1 tablet by mouth daily 30 tablet 3    sertraline (ZOLOFT) 50 MG tablet Take 1 tablet by mouth daily 30 tablet 3     No current facility-administered medications on file prior to visit. Review of Systems   Constitutional: Positive for unexpected weight change. HENT: Negative. Respiratory: Negative. Cardiovascular: Negative. Gastrointestinal: Negative. Genitourinary: Negative. Musculoskeletal: Negative. Skin: Negative. Neurological: Negative. Psychiatric/Behavioral: Negative.          Depression         OBJECTIVE: There were no vitals taken for this visit. Physical Exam  Vitals signs and nursing note reviewed. Constitutional:       Appearance: She is well-developed. HENT:      Head: Normocephalic and atraumatic. Eyes:      Conjunctiva/sclera: Conjunctivae normal.      Pupils: Pupils are equal, round, and reactive to light. Neck:      Musculoskeletal: Normal range of motion and neck supple. Thyroid: No thyromegaly. Vascular: No JVD. Cardiovascular:      Rate and Rhythm: Normal rate and regular rhythm. Heart sounds: No murmur. No friction rub. No gallop. Pulmonary:      Effort: Pulmonary effort is normal. No respiratory distress. Breath sounds: Normal breath sounds. No wheezing or rales. Abdominal:      General: Bowel sounds are normal. There is no distension. Palpations: Abdomen is soft. Tenderness: There is no abdominal tenderness. There is no guarding. Musculoskeletal:         General: No tenderness. Skin:     General: Skin is warm and dry. Findings: No rash. Neurological:      Mental Status: She is alert and oriented to person, place, and time. Psychiatric:         Judgment: Judgment normal.         No results found for requested labs within last 30 days. Hemoglobin A1C (%)   Date Value   03/29/2017 5.1     LDL Calculated (mg/dL)   Date Value   03/06/2020 218 (H)           Lab Results   Component Value Date    TSH 9.96 (H) 03/06/2020         ASSESSMENT/PLAN:     [facility] was seen today for medication management, hypothyroidism, depression and weight management. Diagnoses and all orders for this visit:    Encounter for screening for diabetes mellitus    Depression, unspecified depression type    Middle ear effusion, left    Hypertension, unspecified type      There are no discontinued medications.

## 2021-01-21 NOTE — PROGRESS NOTES
Chief Complaint   Patient presents with    Medication Management    Hypothyroidism    Depression    Weight Management       Have you seen any other physician or provider since your last visit no    Have you had any other diagnostic tests since your last visit? no    Have you changed or stopped any medications since your last visit? no     I have recommended that this patient have a immunization for pneumonia but she declines at this time. I have discussed the risks and benefits of this examination with her. The patient verbalizes understanding. Diabetic retinal exam completed this year? No                       * If yes please have patient sign a records release to obtain record to update Health Maintenance                       * If no, please order referral for patient to be scheduled    Patient is needing refills on her medication. She use to take Adipex from Dr Quinn Mcarthur but has not had them for several months. She only took them for 2 months before Dr Quinn Mcarthur left. She has a treadmill and stopped drinking soda in June. She weighs 280 at the present. Patient was seen by ENT for nodules on her thyroid. She was started on Omeprazole.

## 2021-03-19 ENCOUNTER — HOSPITAL ENCOUNTER (EMERGENCY)
Facility: HOSPITAL | Age: 41
Discharge: HOME | End: 2021-03-19
Attending: EMERGENCY MEDICINE
Payer: MEDICAID

## 2021-03-19 ENCOUNTER — APPOINTMENT (OUTPATIENT)
Dept: GENERAL RADIOLOGY | Facility: HOSPITAL | Age: 41
End: 2021-03-19
Payer: MEDICAID

## 2021-03-19 VITALS
WEIGHT: 260 LBS | TEMPERATURE: 97.6 F | BODY MASS INDEX: 44.39 KG/M2 | HEART RATE: 88 BPM | HEIGHT: 64 IN | DIASTOLIC BLOOD PRESSURE: 112 MMHG | RESPIRATION RATE: 18 BRPM | OXYGEN SATURATION: 98 % | SYSTOLIC BLOOD PRESSURE: 175 MMHG

## 2021-03-19 DIAGNOSIS — S80.11XA CONTUSION OF RIGHT LOWER EXTREMITY, INITIAL ENCOUNTER: Primary | ICD-10-CM

## 2021-03-19 PROCEDURE — 99282 EMERGENCY DEPT VISIT SF MDM: CPT

## 2021-03-19 PROCEDURE — 73590 X-RAY EXAM OF LOWER LEG: CPT

## 2021-03-19 PROCEDURE — 6370000000 HC RX 637 (ALT 250 FOR IP): Performed by: EMERGENCY MEDICINE

## 2021-03-19 RX ORDER — IBUPROFEN 600 MG/1
600 TABLET ORAL ONCE
Status: COMPLETED | OUTPATIENT
Start: 2021-03-19 | End: 2021-03-19

## 2021-03-19 RX ADMIN — IBUPROFEN 600 MG: 600 TABLET, FILM COATED ORAL at 17:00

## 2021-03-19 ASSESSMENT — PAIN SCALES - GENERAL: PAINLEVEL_OUTOF10: 6

## 2021-03-19 NOTE — ED PROVIDER NOTES
[facility] ENCOUNTER      Pt Name: Preethi Mckeon  MRN: [id number]  [place] 1980  Date of evaluation: 3/19/2021  Provider: Elio Dahl DO    CHIEF COMPLAINT       Chief Complaint   Patient presents with    Leg Pain         HISTORY OF PRESENT ILLNESS   (Location/Symptom, Timing/Onset, Context/Setting, Quality, Duration, Modifying Factors, Severity)  Note limiting factors. Preethi Mckeon is a 41 y.o. female who presents to the emergency department with complaint of right shin pain. Patient reports that about 4 to 5 days ago that she fell while trying to take her dog out. Reports she tripped and lost her balance and fell on the deck. Hit her shin on the deck. She reports she still able to ambulate but over the past few days has noted more bruising and pain in her anterior shin. Still able to flex and extend her knee without pain and dorsiflex and plantarflex the ankle. Denies any numbness weakness or tingling distally. Has been taking Tylenol for the pain. Denies any head trauma, loss of conscious. Is not on any anticoagulation. Appropriate PPE was used including an eye shield, gloves, N95 mask, surgical mask during the entire patient encounter and exam.  If necessary (pt being intubated or aerosolizing equipment in use) a gown was also used. Nursing Notes were reviewed.       PAST MEDICAL HISTORY     Past Medical History:   Diagnosis Date    Hyperthyroidism          SURGICAL HISTORY       Past Surgical History:   Procedure Laterality Date     SECTION  2002    TONSILLECTOMY  2002         CURRENT MEDICATIONS       Previous Medications    FLUTICASONE (FLONASE) 50 MCG/ACT NASAL SPRAY    2 sprays by Each Nostril route daily    HYDROCHLOROTHIAZIDE (MICROZIDE) 12.5 MG CAPSULE    Take 1 capsule by mouth every morning    LEVOTHYROXINE (SYNTHROID) 50 MCG TABLET    Take 1 tablet by mouth daily    LORATADINE (CLARITIN) 10 MG TABLET Take 1 tablet by mouth daily    OMEPRAZOLE (PRILOSEC) 40 MG DELAYED RELEASE CAPSULE    TAKE 1 CAPSULE BY MOUTH EVERY DAY AS DIRECTED    SERTRALINE (ZOLOFT) 100 MG TABLET    Take 1 tablet by mouth daily    SERTRALINE (ZOLOFT) 50 MG TABLET    Take 1 tablet by mouth daily       ALLERGIES     Patient has no known allergies.     FAMILY HISTORY       Family History   Problem Relation Age of Onset    Cancer Father         pt unsure of kind did not know father well          SOCIAL HISTORY       Social History     Socioeconomic History    Marital status: Single     Spouse name: None    Number of children: None    Years of education: None    Highest education level: None   Occupational History    None   Social Needs    Financial resource strain: None    Food insecurity     Worry: None     Inability: None    Transportation needs     Medical: None     Non-medical: None   Tobacco Use    Smoking status: Current Every Day Smoker     Packs/day: 0.10     Years: 10.00     Pack years: 1.00     Types: Cigarettes    Smokeless tobacco: Never Used   Substance and Sexual Activity    Alcohol use: Yes     Comment: drinks liquor socially just ever so often    Drug use: No    Sexual activity: None   Lifestyle    Physical activity     Days per week: None     Minutes per session: None    Stress: None   Relationships    Social connections     Talks on phone: None     Gets together: None     Attends Yazidism service: None     Active member of club or organization: None     Attends meetings of clubs or organizations: None     Relationship status: None    Intimate partner violence     Fear of current or ex partner: None     Emotionally abused: None     Physically abused: None     Forced sexual activity: None   Other Topics Concern    None   Social History Narrative    None       SCREENINGS               Review of Systems  Constitutional:  Denies fever, chills  Eyes: denies eye problems  HEENT: denies sore throat or ear pain  Respiratory: denies cough or shortness of breath  Cardiovascular: denies chest pain, palpitations  GI: denies abdominal pain, nausea, vomiting, or diarrhea  Musculoskeletal:   Skin: denies rash  Neurologic: denies focal weakness or sensory changes    Except as noted above the remainder of the review of systems was reviewed and negative. PHYSICAL EXAM    (up to 7 for level 4, 8 or more for level 5)     ED Triage Vitals [03/19/21 1602]   BP Temp Temp Source Pulse Resp SpO2 Height Weight   (!) 189/114 97.6 °F (36.4 °C) Oral 88 18 99 % 5' 4\" (1.626 m) 260 lb (117.9 kg)       General appearance: well-developed, well-nourished, no acute distress, nontoxic appearance  Neck: normal range of motion, no tenderness, trachea midline, no stridor  Respiratory: normal breath sounds, non labored breathing pattern  Cardiovascular: normal heart rate, normal rhythm  GI: nontender, bowel sounds normal, soft, nondistended, no pulsatile masses  Musculoskeletal:Ecchymosis noted over the anterior and lateral portion of the right calf, compartments soft, normal dorsiflexion plantarflexion of the ankle, normal flexion extension at the knee, 2+ DP and PT pulse distally, normal sensation distally, intact distal pulses, no clubbing, cyanosis. Good range of motion  Back: no tenderness  Integument: warm, dry, no erythema, no rash, < 2 second cap refill  Neurologic: alert and oriented ×3, no focal deficits appreciated    DIAGNOSTIC RESULTS       RADIOLOGY:   Interpretation per the Radiologist below, if available at the time of this note:    XR TIBIA FIBULA RIGHT (2 VIEWS)   Final Result   Impression:    No acute osseous abnormality. Diffuse soft tissue swelling. LABS:  Labs Reviewed - No data to display    All other labs were within normal range or not returned as of this dictation.     EMERGENCY DEPARTMENT COURSE and DIFFERENTIAL DIAGNOSIS/MDM:   Vitals:    Vitals:    03/19/21 1602   BP: (!) 189/114   Pulse: 88   Resp:

## 2021-03-19 NOTE — ED NOTES
Discharge instructions reviewed with verbalized understanding from patient. Patient had no further questions or concerns.         Alexy Shaikh RN  03/19/21 0105

## 2021-04-29 NOTE — TELEPHONE ENCOUNTER
Pt called requesting a refill on omeprazole 40mg.  This was written by historical provider in the past.

## 2021-04-30 RX ORDER — OMEPRAZOLE 40 MG/1
CAPSULE, DELAYED RELEASE ORAL
Qty: 90 CAPSULE | Refills: 2 | Status: SHIPPED | OUTPATIENT
Start: 2021-04-30

## 2021-05-18 ENCOUNTER — APPOINTMENT (OUTPATIENT)
Dept: CT IMAGING | Facility: HOSPITAL | Age: 41
End: 2021-05-18
Payer: MEDICAID

## 2021-05-18 ENCOUNTER — HOSPITAL ENCOUNTER (EMERGENCY)
Facility: HOSPITAL | Age: 41
Discharge: ANOTHER HOSPITAL | End: 2021-05-18
Attending: EMERGENCY MEDICINE
Payer: MEDICAID

## 2021-05-18 VITALS
SYSTOLIC BLOOD PRESSURE: 132 MMHG | WEIGHT: 260 LBS | HEART RATE: 101 BPM | BODY MASS INDEX: 44.39 KG/M2 | DIASTOLIC BLOOD PRESSURE: 88 MMHG | OXYGEN SATURATION: 97 % | HEIGHT: 64 IN | RESPIRATION RATE: 18 BRPM | TEMPERATURE: 99 F

## 2021-05-18 DIAGNOSIS — K63.1 BOWEL PERFORATION (HCC): ICD-10-CM

## 2021-05-18 DIAGNOSIS — K57.32 SIGMOID DIVERTICULITIS: Primary | ICD-10-CM

## 2021-05-18 LAB
A/G RATIO: 0.9 (ref 0.8–2)
ALBUMIN SERPL-MCNC: 3.9 G/DL (ref 3.4–4.8)
ALP BLD-CCNC: 95 U/L (ref 25–100)
ALT SERPL-CCNC: 80 U/L (ref 4–36)
ANION GAP SERPL CALCULATED.3IONS-SCNC: 11 MMOL/L (ref 3–16)
AST SERPL-CCNC: 62 U/L (ref 8–33)
BASOPHILS ABSOLUTE: 0 K/UL (ref 0–0.1)
BASOPHILS RELATIVE PERCENT: 0.2 %
BILIRUB SERPL-MCNC: 0.4 MG/DL (ref 0.3–1.2)
BILIRUBIN URINE: NEGATIVE
BLOOD, URINE: NEGATIVE
BUN BLDV-MCNC: 13 MG/DL (ref 6–20)
CALCIUM SERPL-MCNC: 9.8 MG/DL (ref 8.5–10.5)
CHLORIDE BLD-SCNC: 103 MMOL/L (ref 98–107)
CLARITY: CLEAR
CO2: 23 MMOL/L (ref 20–30)
COLOR: YELLOW
CREAT SERPL-MCNC: 0.9 MG/DL (ref 0.4–1.2)
EOSINOPHILS ABSOLUTE: 0.1 K/UL (ref 0–0.4)
EOSINOPHILS RELATIVE PERCENT: 0.6 %
GFR AFRICAN AMERICAN: >59
GFR NON-AFRICAN AMERICAN: >60
GLOBULIN: 4.2 G/DL
GLUCOSE BLD-MCNC: 129 MG/DL (ref 74–106)
GLUCOSE URINE: NEGATIVE MG/DL
HCT VFR BLD CALC: 42.5 % (ref 37–47)
HEMOGLOBIN: 14.1 G/DL (ref 11.5–16.5)
IMMATURE GRANULOCYTES #: 0.1 K/UL
IMMATURE GRANULOCYTES %: 0.4 % (ref 0–5)
KETONES, URINE: NEGATIVE MG/DL
LACTIC ACID: 1.8 MMOL/L (ref 0.4–2)
LEUKOCYTE ESTERASE, URINE: NEGATIVE
LIPASE: 12 U/L (ref 5.6–51.3)
LYMPHOCYTES ABSOLUTE: 1.8 K/UL (ref 1.5–4)
LYMPHOCYTES RELATIVE PERCENT: 14.5 %
MCH RBC QN AUTO: 29.5 PG (ref 27–32)
MCHC RBC AUTO-ENTMCNC: 33.2 G/DL (ref 31–35)
MCV RBC AUTO: 88.9 FL (ref 80–100)
MICROSCOPIC EXAMINATION: ABNORMAL
MONOCYTES ABSOLUTE: 0.6 K/UL (ref 0.2–0.8)
MONOCYTES RELATIVE PERCENT: 4.7 %
NEUTROPHILS ABSOLUTE: 9.9 K/UL (ref 2–7.5)
NEUTROPHILS RELATIVE PERCENT: 79.6 %
NITRITE, URINE: NEGATIVE
PDW BLD-RTO: 12.6 % (ref 11–16)
PH UA: 7 (ref 5–8)
PLATELET # BLD: 283 K/UL (ref 150–400)
PMV BLD AUTO: 10.1 FL (ref 6–10)
POTASSIUM SERPL-SCNC: 4.1 MMOL/L (ref 3.4–5.1)
PROTEIN UA: NEGATIVE MG/DL
RBC # BLD: 4.78 M/UL (ref 3.8–5.8)
SARS-COV-2, NAAT: NOT DETECTED
SODIUM BLD-SCNC: 137 MMOL/L (ref 136–145)
SPECIFIC GRAVITY UA: 1.02 (ref 1–1.03)
TOTAL PROTEIN: 8.1 G/DL (ref 6.4–8.3)
URINE REFLEX TO CULTURE: ABNORMAL
URINE TYPE: ABNORMAL
UROBILINOGEN, URINE: >=8 E.U./DL
WBC # BLD: 12.4 K/UL (ref 4–11)

## 2021-05-18 PROCEDURE — 6360000002 HC RX W HCPCS: Performed by: EMERGENCY MEDICINE

## 2021-05-18 PROCEDURE — 85025 COMPLETE CBC W/AUTO DIFF WBC: CPT

## 2021-05-18 PROCEDURE — 87635 SARS-COV-2 COVID-19 AMP PRB: CPT

## 2021-05-18 PROCEDURE — 2500000003 HC RX 250 WO HCPCS: Performed by: EMERGENCY MEDICINE

## 2021-05-18 PROCEDURE — 36415 COLL VENOUS BLD VENIPUNCTURE: CPT

## 2021-05-18 PROCEDURE — 83605 ASSAY OF LACTIC ACID: CPT

## 2021-05-18 PROCEDURE — 74176 CT ABD & PELVIS W/O CONTRAST: CPT

## 2021-05-18 PROCEDURE — 6360000002 HC RX W HCPCS

## 2021-05-18 PROCEDURE — 96367 TX/PROPH/DG ADDL SEQ IV INF: CPT

## 2021-05-18 PROCEDURE — 80053 COMPREHEN METABOLIC PANEL: CPT

## 2021-05-18 PROCEDURE — 83690 ASSAY OF LIPASE: CPT

## 2021-05-18 PROCEDURE — 81003 URINALYSIS AUTO W/O SCOPE: CPT

## 2021-05-18 PROCEDURE — 96375 TX/PRO/DX INJ NEW DRUG ADDON: CPT

## 2021-05-18 PROCEDURE — 2580000003 HC RX 258: Performed by: EMERGENCY MEDICINE

## 2021-05-18 PROCEDURE — 99283 EMERGENCY DEPT VISIT LOW MDM: CPT

## 2021-05-18 PROCEDURE — 96365 THER/PROPH/DIAG IV INF INIT: CPT

## 2021-05-18 RX ORDER — MORPHINE SULFATE 4 MG/ML
4 INJECTION, SOLUTION INTRAMUSCULAR; INTRAVENOUS ONCE
Status: DISCONTINUED | OUTPATIENT
Start: 2021-05-18 | End: 2021-05-19 | Stop reason: HOSPADM

## 2021-05-18 RX ORDER — 0.9 % SODIUM CHLORIDE 0.9 %
1000 INTRAVENOUS SOLUTION INTRAVENOUS ONCE
Status: COMPLETED | OUTPATIENT
Start: 2021-05-18 | End: 2021-05-18

## 2021-05-18 RX ORDER — MORPHINE SULFATE 4 MG/ML
INJECTION, SOLUTION INTRAMUSCULAR; INTRAVENOUS
Status: COMPLETED
Start: 2021-05-18 | End: 2021-05-18

## 2021-05-18 RX ORDER — ONDANSETRON 2 MG/ML
4 INJECTION INTRAMUSCULAR; INTRAVENOUS ONCE
Status: COMPLETED | OUTPATIENT
Start: 2021-05-18 | End: 2021-05-18

## 2021-05-18 RX ORDER — MORPHINE SULFATE 4 MG/ML
4 INJECTION, SOLUTION INTRAMUSCULAR; INTRAVENOUS ONCE
Status: COMPLETED | OUTPATIENT
Start: 2021-05-18 | End: 2021-05-18

## 2021-05-18 RX ADMIN — CEFTAZIDIME 1000 MG: 1 INJECTION, POWDER, FOR SOLUTION INTRAMUSCULAR; INTRAVENOUS at 22:26

## 2021-05-18 RX ADMIN — ONDANSETRON 4 MG: 2 INJECTION INTRAMUSCULAR; INTRAVENOUS at 19:54

## 2021-05-18 RX ADMIN — SODIUM CHLORIDE 1000 ML: 9 INJECTION, SOLUTION INTRAVENOUS at 19:54

## 2021-05-18 RX ADMIN — MORPHINE SULFATE 4 MG: 4 INJECTION, SOLUTION INTRAMUSCULAR; INTRAVENOUS at 22:34

## 2021-05-18 RX ADMIN — MORPHINE SULFATE 4 MG: 4 INJECTION, SOLUTION INTRAMUSCULAR; INTRAVENOUS at 19:54

## 2021-05-18 RX ADMIN — METRONIDAZOLE 500 MG: 500 INJECTION, SOLUTION INTRAVENOUS at 21:22

## 2021-05-18 ASSESSMENT — PAIN DESCRIPTION - ORIENTATION: ORIENTATION: MID

## 2021-05-18 ASSESSMENT — PAIN DESCRIPTION - FREQUENCY
FREQUENCY: INTERMITTENT
FREQUENCY: INTERMITTENT

## 2021-05-18 ASSESSMENT — PAIN DESCRIPTION - DESCRIPTORS: DESCRIPTORS: PRESSURE;TENDER

## 2021-05-18 ASSESSMENT — PAIN SCALES - GENERAL: PAINLEVEL_OUTOF10: 7

## 2021-05-18 ASSESSMENT — PAIN DESCRIPTION - PAIN TYPE: TYPE: ACUTE PAIN

## 2021-05-18 ASSESSMENT — PAIN DESCRIPTION - LOCATION
LOCATION: ABDOMEN
LOCATION: ABDOMEN

## 2021-05-18 NOTE — ED PROVIDER NOTES
[address] [place] [address] Court  eMERGENCY dEPARTMENT eNCOUnter      Pt Name: Garima Newman  MRN: [id number]  YOB: 1980  Date ofevaluation:   Provider: Garry Painter MD    [address] [place] [address]       Chief Complaint   Patient presents with    Abdominal Pain     patient states it feels like pressure/pain     Nausea         HISTORY OF PRESENT ILLNESS  (Location/Symptom, Timing/Onset, Context/Setting, Quality, Duration, Modifying Factors, Severity.)   Garima Newman is a 41 y.o. female who presents to the emergency department with the onset of abdominal bloating 2 days ago with diffuse abdominal pain starting yesterday. She describes the pain \"like she worked out or soreness\". Pain is worse with movements or going over bumps in the car. No radiation of pain. She took Tylenol PM but didn't really help the pain but helped her sleep. She had subjective fever and chills last night. She is having watery diarrhea but no blood in her stool. Nursing notes were reviewed. REVIEW OF SYSTEMS    (2-9systems for level 4, 10 or more for level 5)   ROS:  General:  + fevers, + chills, no weakness  HEENT: No sore throat, runny nose or ear pain  Cardiovascular:  No chest pain, no palpitations  Respiratory:  No shortness of breath, no cough, no wheezing  Gastrointestinal:  + pain, no nausea, no vomiting, + diarrhea  Musculoskeletal:  No muscle pain, no joint pain  Skin:  No rash, no easy bruising  Genitourinary:  No dysuria, no hematuria    Except as noted above theremainder of the review of systems was reviewed and negative.        PASTMEDICAL HISTORY     Past Medical History:   Diagnosis Date    Depression     Hypertension     Hyperthyroidism          SURGICAL HISTORY       Past Surgical History:   Procedure Laterality Date     SECTION  2002    TONSILLECTOMY  2002         CURRENT MEDICATIONS       Previous Medications    FLUTICASONE (FLONASE) 50 MCG/ACT NASAL SPRAY    2 Gatherings with Friends and Family:     Attends Yazdanism Services:     Active Member of Clubs or Organizations:     Attends Club or Organization Meetings:     Marital Status:    Intimate Partner Violence:     Fear of Current or Ex-Partner:     Emotionally Abused:     Physically Abused:     Sexually Abused:          PHYSICAL EXAM    (up to 7 forlevel 4, 8 or more for level 5)     ED Triage Vitals [05/18/21 1922]   BP Temp Temp Source Pulse Resp SpO2 Height Weight   (!) 153/111 99 °F (37.2 °C) Oral 109 18 97 % 5' 4\" (1.626 m) 260 lb (117.9 kg)       Physical Exam  General :Patient is awake, alert, oriented, in no acute distress, nontoxic appearing  HEENT: Pupils are equally round and reactive to light, EOMI. Cardiac: Heart regular rate, rhythm, no murmurs, rubs, or gallops  Lungs: Lungs are clear to auscultation, there is no wheezing, rhonchi, or rales. Abdomen: Abdomen is soft, diffuse tenderness to palpation but greatest in the LLQ; RUQ was tender; she had some point tenderness to RLQ as well, nondistended. Musculoskeletal: Ambulatory  Back: No midline or bony tenderness. Dermatology: Skin is warm and dry  Psych: Mentation is grossly normal, cognition is grossly normal. Affect is appropriate. DIAGNOSTIC RESULTS       RADIOLOGY:   Non-plain film images such as CT, Ultrasoundand MRI are read by the radiologist. Plain radiographic images are visualized and preliminarily interpreted by the emergency physician with the below findings:      [] Radiologist's Report Reviewed:  CT ABDOMEN PELVIS WO CONTRAST Additional Contrast? None   Final Result      1. Sigmoid colonic wall thickening with extensive surrounding inflammatory changes and a few small foci of extraluminal gas in the sigmoid mesial colon. The findings are favored to be related to sigmoid diverticulitis. Perforated colon cancer is    considered less likely but follow-up is recommended. No evidence of diverticular abscess.    2. Hepatic steatosis. 3. Punctate calculus in the upper pole of the left kidney.             ED BEDSIDE ULTRASOUND:   Performed by ED Physician - none    LABS:  Labs Reviewed   CBC WITH AUTO DIFFERENTIAL - Abnormal; Notable for the following components:       Result Value    WBC 12.4 (*)     MPV 10.1 (*)     Neutrophils Absolute 9.9 (*)     All other components within normal limits    Narrative:     Performed at:  [address] [place] [address] Laboratory  [address] [place] [address] [employer] [facility], [place] [address]   Phone [phone]   COMPREHENSIVE METABOLIC PANEL - Abnormal; Notable for the following components:    Glucose 129 (*)     ALT 80 (*)     AST 62 (*)     All other components within normal limits    Narrative:     Performed at:  [address] [place] [address] Laboratory  [address] [place] [address] [employer] [facility], [place] [address]   Phone [phone]   [address] - Abnormal; Notable for the following components:    Urobilinogen, Urine >=8.0 (*)     All other components within normal limits    Narrative:     Performed at:  [address] [place] [address] Laboratory  [address] [place] [address] [employer], [place] [address]   Phone [phone], RAPID    Narrative:     Performed at:  [address] [place] [address] Laboratory  [address] [place] [address] [employer] [facility], [place] [address]   Phone [phone], RAPID   LIPASE    Narrative:     Performed at:  [address] [place] [address] Laboratory  [address] [place] [address] [employer] [facility], [place] [address]   Phone [phone]   LACTIC ACID, PLASMA    Narrative:     Performed at:  [address] [place] [address] Laboratory  [address] [place] [address] [employer] [facility], [place] [address]   Phone [phone]       I have reviewed and interpreted all of the currently available lab resultsfrom this visit (if applicable):  Results for orders placed or performed during the hospital encounter of 05/18/21   COVID-19, Rapid    Specimen: Nasopharyngeal Swab Result Value Ref Range    SARS-CoV-2, NAAT Not Detected Not Detected   CBC Auto Differential   Result Value Ref Range    WBC 12.4 (H) 4.0 - 11.0 K/uL    RBC 4.78 3.80 - 5.80 M/uL    Hemoglobin 14.1 11.5 - 16.5 g/dL    Hematocrit 42.5 37.0 - 47.0 %    MCV 88.9 80.0 - 100.0 fL    MCH 29.5 27.0 - 32.0 pg    MCHC 33.2 31.0 - 35.0 g/dL    RDW 12.6 11.0 - 16.0 %    Platelets 841 261 - 532 K/uL    MPV 10.1 (H) 6.0 - 10.0 fL    Neutrophils % 79.6 %    Immature Granulocytes % 0.4 0.0 - 5.0 %    Lymphocytes % 14.5 %    Monocytes % 4.7 %    Eosinophils % 0.6 %    Basophils % 0.2 %    Neutrophils Absolute 9.9 (H) 2.0 - 7.5 K/uL    Immature Granulocytes # 0.1 K/uL    Lymphocytes Absolute 1.8 1.5 - 4.0 K/uL    Monocytes Absolute 0.6 0.2 - 0.8 K/uL    Eosinophils Absolute 0.1 0.0 - 0.4 K/uL    Basophils Absolute 0.0 0.0 - 0.1 K/uL   Comprehensive Metabolic Panel   Result Value Ref Range    Sodium 137 136 - 145 mmol/L    Potassium 4.1 3.4 - 5.1 mmol/L    Chloride 103 98 - 107 mmol/L    CO2 23 20 - 30 mmol/L    Anion Gap 11 3 - 16    Glucose 129 (H) 74 - 106 mg/dL    BUN 13 6 - 20 mg/dL    CREATININE 0.9 0.4 - 1.2 mg/dL    GFR Non-African American >60 >59    GFR African American >59 >59    Calcium 9.8 8.5 - 10.5 mg/dL    Total Protein 8.1 6.4 - 8.3 g/dL    Albumin 3.9 3.4 - 4.8 g/dL    Albumin/Globulin Ratio 0.9 0.8 - 2.0    Total Bilirubin 0.4 0.3 - 1.2 mg/dL    Alkaline Phosphatase 95 25 - 100 U/L    ALT 80 (H) 4 - 36 U/L    AST 62 (H) 8 - 33 U/L    Globulin 4.2 g/dL   Lipase   Result Value Ref Range    Lipase 12.0 5.6 - 51.3 U/L   Urinalysis Reflex to Culture    Specimen: Urine, clean catch   Result Value Ref Range    Color, UA Yellow Straw/Yellow    Clarity, UA Clear Clear    Glucose, Ur Negative Negative mg/dL    Bilirubin Urine Negative Negative    Ketones, Urine Negative Negative mg/dL    Specific Gravity, UA 1.020 1.005 - 1.030    Blood, Urine Negative Negative    pH, UA 7.0 5.0 - 8.0    Protein, UA Negative Negative mg/dL provider forclarification.     Tevin Adam MD  Attending Emergency Physician                  Tevin Adam MD  05/18/21 4560

## 2021-05-19 NOTE — ED NOTES
[facility] called, advised that [facility] was requesting a COVID test result on PT. RN advised, COVID ordered.      Yarely Fenton  05/18/21 3280

## 2021-05-19 NOTE — ED NOTES
Tere Cardoso from [facility] called back, advised that PT has been admitted to [facility] Unit 4a. Call report to - [phone]. When EMS arrives they will go to ER and meet with registration which will give the room number.       Valda Sever  05/18/21 0666

## 2021-05-19 NOTE — ED NOTES
Contacted [facility], advised them of request to contact surgical team at [facility] transfer center.  Spoke with Javier Britt, she advised that MAC was already placing the call to [facility] transfer center at this time for surgical team.      Yeimi Mccall  05/18/21 8436

## 2021-05-19 NOTE — ED NOTES
Called [facility] at this time to initiate transfer. Asked [facility] to contact R. Spoke with Lele Gill RN.       Hal Marshall  05/18/21 4090

## 2021-05-19 NOTE — ED NOTES
Report to [facility] @ [address] [facility], [address] [place] [address]  05/18/21 7018

## 2021-11-06 ENCOUNTER — HOSPITAL ENCOUNTER (EMERGENCY)
Facility: HOSPITAL | Age: 41
Discharge: HOME | End: 2021-11-06
Attending: EMERGENCY MEDICINE
Payer: MEDICAID

## 2021-11-06 ENCOUNTER — APPOINTMENT (OUTPATIENT)
Dept: CT IMAGING | Facility: HOSPITAL | Age: 41
End: 2021-11-06
Payer: MEDICAID

## 2021-11-06 VITALS
OXYGEN SATURATION: 98 % | DIASTOLIC BLOOD PRESSURE: 80 MMHG | HEIGHT: 64 IN | SYSTOLIC BLOOD PRESSURE: 143 MMHG | BODY MASS INDEX: 46.95 KG/M2 | WEIGHT: 275 LBS | TEMPERATURE: 98.2 F | RESPIRATION RATE: 18 BRPM | HEART RATE: 98 BPM

## 2021-11-06 DIAGNOSIS — K57.32 SIGMOID DIVERTICULITIS: Primary | ICD-10-CM

## 2021-11-06 DIAGNOSIS — G25.71 DRUG-INDUCED AKATHISIA: ICD-10-CM

## 2021-11-06 LAB
A/G RATIO: 1.2 (ref 0.8–2)
ALBUMIN SERPL-MCNC: 4.2 G/DL (ref 3.4–4.8)
ALP BLD-CCNC: 71 U/L (ref 25–100)
ALT SERPL-CCNC: 126 U/L (ref 4–36)
ANION GAP SERPL CALCULATED.3IONS-SCNC: 8 MMOL/L (ref 3–16)
AST SERPL-CCNC: 42 U/L (ref 8–33)
BASOPHILS ABSOLUTE: 0.1 K/UL (ref 0–0.1)
BASOPHILS RELATIVE PERCENT: 0.5 %
BILIRUB SERPL-MCNC: 0.5 MG/DL (ref 0.3–1.2)
BILIRUBIN URINE: NEGATIVE
BLOOD, URINE: NEGATIVE
BUN BLDV-MCNC: 11 MG/DL (ref 6–20)
CALCIUM SERPL-MCNC: 9.1 MG/DL (ref 8.5–10.5)
CHLORIDE BLD-SCNC: 100 MMOL/L (ref 98–107)
CLARITY: CLEAR
CO2: 26 MMOL/L (ref 20–30)
COLOR: YELLOW
CREAT SERPL-MCNC: 0.7 MG/DL (ref 0.4–1.2)
EOSINOPHILS ABSOLUTE: 0.1 K/UL (ref 0–0.4)
EOSINOPHILS RELATIVE PERCENT: 0.6 %
GFR AFRICAN AMERICAN: >59
GFR NON-AFRICAN AMERICAN: >60
GLOBULIN: 3.6 G/DL
GLUCOSE BLD-MCNC: 115 MG/DL (ref 74–106)
GLUCOSE URINE: NEGATIVE MG/DL
HCT VFR BLD CALC: 44.6 % (ref 37–47)
HEMOGLOBIN: 14.6 G/DL (ref 11.5–16.5)
IMMATURE GRANULOCYTES #: 0 K/UL
IMMATURE GRANULOCYTES %: 0.3 % (ref 0–5)
KETONES, URINE: NEGATIVE MG/DL
LACTIC ACID: 1.8 MMOL/L (ref 0.4–2)
LEUKOCYTE ESTERASE, URINE: NEGATIVE
LIPASE: 15 U/L (ref 5.6–51.3)
LYMPHOCYTES ABSOLUTE: 2.3 K/UL (ref 1.5–4)
LYMPHOCYTES RELATIVE PERCENT: 20.7 %
MCH RBC QN AUTO: 29.3 PG (ref 27–32)
MCHC RBC AUTO-ENTMCNC: 32.7 G/DL (ref 31–35)
MCV RBC AUTO: 89.4 FL (ref 80–100)
MICROSCOPIC EXAMINATION: NORMAL
MONOCYTES ABSOLUTE: 0.7 K/UL (ref 0.2–0.8)
MONOCYTES RELATIVE PERCENT: 6.6 %
NEUTROPHILS ABSOLUTE: 7.8 K/UL (ref 2–7.5)
NEUTROPHILS RELATIVE PERCENT: 71.3 %
NITRITE, URINE: NEGATIVE
PDW BLD-RTO: 12.8 % (ref 11–16)
PH UA: 7 (ref 5–8)
PLATELET # BLD: 264 K/UL (ref 150–400)
PMV BLD AUTO: 9.6 FL (ref 6–10)
POTASSIUM SERPL-SCNC: 4.2 MMOL/L (ref 3.4–5.1)
PROTEIN UA: NEGATIVE MG/DL
RBC # BLD: 4.99 M/UL (ref 3.8–5.8)
SODIUM BLD-SCNC: 134 MMOL/L (ref 136–145)
SPECIFIC GRAVITY UA: 1.02 (ref 1–1.03)
TOTAL PROTEIN: 7.8 G/DL (ref 6.4–8.3)
URINE REFLEX TO CULTURE: NORMAL
URINE TYPE: NORMAL
UROBILINOGEN, URINE: 0.2 E.U./DL
WBC # BLD: 10.9 K/UL (ref 4–11)

## 2021-11-06 PROCEDURE — 2580000003 HC RX 258: Performed by: EMERGENCY MEDICINE

## 2021-11-06 PROCEDURE — 6360000002 HC RX W HCPCS: Performed by: EMERGENCY MEDICINE

## 2021-11-06 PROCEDURE — 83605 ASSAY OF LACTIC ACID: CPT

## 2021-11-06 PROCEDURE — 96375 TX/PRO/DX INJ NEW DRUG ADDON: CPT

## 2021-11-06 PROCEDURE — 83690 ASSAY OF LIPASE: CPT

## 2021-11-06 PROCEDURE — 85025 COMPLETE CBC W/AUTO DIFF WBC: CPT

## 2021-11-06 PROCEDURE — 6360000002 HC RX W HCPCS

## 2021-11-06 PROCEDURE — 96374 THER/PROPH/DIAG INJ IV PUSH: CPT

## 2021-11-06 PROCEDURE — 81003 URINALYSIS AUTO W/O SCOPE: CPT

## 2021-11-06 PROCEDURE — 74176 CT ABD & PELVIS W/O CONTRAST: CPT

## 2021-11-06 PROCEDURE — 99283 EMERGENCY DEPT VISIT LOW MDM: CPT

## 2021-11-06 PROCEDURE — 36415 COLL VENOUS BLD VENIPUNCTURE: CPT

## 2021-11-06 PROCEDURE — 6370000000 HC RX 637 (ALT 250 FOR IP): Performed by: EMERGENCY MEDICINE

## 2021-11-06 PROCEDURE — 80053 COMPREHEN METABOLIC PANEL: CPT

## 2021-11-06 RX ORDER — PROMETHAZINE HYDROCHLORIDE 25 MG/1
25 TABLET ORAL EVERY 6 HOURS PRN
Qty: 30 TABLET | Refills: 0 | Status: SHIPPED | OUTPATIENT
Start: 2021-11-06

## 2021-11-06 RX ORDER — METRONIDAZOLE 500 MG/1
500 TABLET ORAL ONCE
Status: COMPLETED | OUTPATIENT
Start: 2021-11-06 | End: 2021-11-06

## 2021-11-06 RX ORDER — DROPERIDOL 2.5 MG/ML
1.25 INJECTION, SOLUTION INTRAMUSCULAR; INTRAVENOUS ONCE
Status: COMPLETED | OUTPATIENT
Start: 2021-11-06 | End: 2021-11-06

## 2021-11-06 RX ORDER — DIPHENHYDRAMINE HYDROCHLORIDE 50 MG/ML
INJECTION INTRAMUSCULAR; INTRAVENOUS
Status: COMPLETED
Start: 2021-11-06 | End: 2021-11-06

## 2021-11-06 RX ORDER — HYDROCODONE BITARTRATE AND ACETAMINOPHEN 7.5; 325 MG/1; MG/1
1 TABLET ORAL EVERY 6 HOURS PRN
Qty: 12 TABLET | Refills: 0 | Status: SHIPPED | OUTPATIENT
Start: 2021-11-06 | End: 2021-11-09

## 2021-11-06 RX ORDER — DROPERIDOL 2.5 MG/ML
INJECTION, SOLUTION INTRAMUSCULAR; INTRAVENOUS
Status: DISCONTINUED
Start: 2021-11-06 | End: 2021-11-06 | Stop reason: HOSPADM

## 2021-11-06 RX ORDER — 0.9 % SODIUM CHLORIDE 0.9 %
1000 INTRAVENOUS SOLUTION INTRAVENOUS ONCE
Status: COMPLETED | OUTPATIENT
Start: 2021-11-06 | End: 2021-11-06

## 2021-11-06 RX ORDER — HYDRALAZINE HYDROCHLORIDE 20 MG/ML
10 INJECTION INTRAMUSCULAR; INTRAVENOUS ONCE
Status: DISCONTINUED | OUTPATIENT
Start: 2021-11-06 | End: 2021-11-06 | Stop reason: HOSPADM

## 2021-11-06 RX ORDER — DIPHENHYDRAMINE HYDROCHLORIDE 50 MG/ML
50 INJECTION INTRAMUSCULAR; INTRAVENOUS ONCE
Status: COMPLETED | OUTPATIENT
Start: 2021-11-06 | End: 2021-11-06

## 2021-11-06 RX ORDER — LEVOFLOXACIN 500 MG/1
500 TABLET, FILM COATED ORAL ONCE
Status: COMPLETED | OUTPATIENT
Start: 2021-11-06 | End: 2021-11-06

## 2021-11-06 RX ORDER — LEVOFLOXACIN 500 MG/1
500 TABLET, FILM COATED ORAL DAILY
Qty: 10 TABLET | Refills: 0 | Status: SHIPPED | OUTPATIENT
Start: 2021-11-06 | End: 2021-11-16

## 2021-11-06 RX ORDER — METRONIDAZOLE 500 MG/1
500 TABLET ORAL 3 TIMES DAILY
Qty: 21 TABLET | Refills: 0 | Status: SHIPPED | OUTPATIENT
Start: 2021-11-06 | End: 2021-11-13

## 2021-11-06 RX ORDER — ONDANSETRON 2 MG/ML
4 INJECTION INTRAMUSCULAR; INTRAVENOUS ONCE
Status: DISCONTINUED | OUTPATIENT
Start: 2021-11-06 | End: 2021-11-06

## 2021-11-06 RX ADMIN — DROPERIDOL 1.25 MG: 2.5 INJECTION, SOLUTION INTRAMUSCULAR; INTRAVENOUS at 16:27

## 2021-11-06 RX ADMIN — LEVOFLOXACIN 500 MG: 500 TABLET, FILM COATED ORAL at 18:39

## 2021-11-06 RX ADMIN — METRONIDAZOLE 500 MG: 500 TABLET ORAL at 18:39

## 2021-11-06 RX ADMIN — SODIUM CHLORIDE 1000 ML: 9 INJECTION, SOLUTION INTRAVENOUS at 16:27

## 2021-11-06 RX ADMIN — DIPHENHYDRAMINE HYDROCHLORIDE 50 MG: 50 INJECTION INTRAMUSCULAR; INTRAVENOUS at 17:28

## 2021-11-06 RX ADMIN — DIPHENHYDRAMINE HYDROCHLORIDE 50 MG: 50 INJECTION, SOLUTION INTRAMUSCULAR; INTRAVENOUS at 17:28

## 2021-11-06 ASSESSMENT — PAIN DESCRIPTION - ORIENTATION: ORIENTATION: LEFT

## 2021-11-06 ASSESSMENT — PAIN DESCRIPTION - DESCRIPTORS: DESCRIPTORS: ACHING

## 2021-11-06 ASSESSMENT — PAIN DESCRIPTION - PAIN TYPE: TYPE: ACUTE PAIN

## 2021-11-06 ASSESSMENT — PAIN DESCRIPTION - LOCATION: LOCATION: ABDOMEN

## 2021-11-06 ASSESSMENT — PAIN DESCRIPTION - FREQUENCY: FREQUENCY: CONTINUOUS

## 2021-11-06 ASSESSMENT — PAIN SCALES - GENERAL: PAINLEVEL_OUTOF10: 8

## 2021-11-06 NOTE — ED NOTES
Patient unhooked to go to the restroom at this time, urine not collected. Patient then grabbed her jacket and was witnessed walking out of the er.       Camila Garcia RN  11/06/21 8846

## 2021-11-06 NOTE — ED NOTES
Patient with c/o left abdominal pain since awakening since 0800 this am. Patient states that she has been hurting so bad that she couldn't eat. Patient states that she started vomiting when she arrived to the er.      Dhaval Banuelos RN  11/06/21 4842

## 2021-11-06 NOTE — ED PROVIDER NOTES
[address]  eMERGENCY dEPARTMENT eNCOUnter      Pt Name: Arcadio Booth  MRN: [id number]  YOB: 1980  Date ofevaluation:   Provider: Marla Craven MD    CHIEF COMPLAINT       Chief Complaint   Patient presents with    Abdominal Pain         HISTORY OF PRESENT ILLNESS  (Location/Symptom, Timing/Onset, Context/Setting, Quality, Duration, Modifying Factors, Severity.)   Arcadio Booth is a 35 y.o. female who presents to the emergency department with LLQ pain that started this morning. Pain is \"sharp\" and severe and similar to previous episode of diverticulitis.  + diarrhea. She vomited in the waiting room but had not done so at home. Nursing notes were reviewed. REVIEW OF SYSTEMS    (2-9systems for level 4, 10 or more for level 5)   ROS:  General:  No fevers, no chills, no weakness  HEENT: No sore throat, runny nose or ear pain  Cardiovascular:  No chest pain, no palpitations  Respiratory:  No shortness of breath, no cough, no wheezing  Gastrointestinal:  + LLQ pain, + nausea, no vomiting, + diarrhea  Musculoskeletal:  No muscle pain, no joint pain  Skin:  No rash, no easy bruising  Genitourinary:  No dysuria, no hematuria    Except as noted above theremainder of the review of systems was reviewed and negative.        PASTMEDICAL HISTORY     Past Medical History:   Diagnosis Date    Depression     Diverticulitis     Hypertension     Hyperthyroidism          SURGICAL HISTORY       Past Surgical History:   Procedure Laterality Date     SECTION  2002    TONSILLECTOMY  2002         CURRENT MEDICATIONS       Previous Medications    FLUTICASONE (FLONASE) 50 MCG/ACT NASAL SPRAY    2 sprays by Each Nostril route daily    HYDROCHLOROTHIAZIDE (MICROZIDE) 12.5 MG CAPSULE    Take 1 capsule by mouth every morning    LEVOTHYROXINE (SYNTHROID) 50 MCG TABLET    Take 1 tablet by mouth daily    LORATADINE (CLARITIN) 10 MG TABLET    Take 1 tablet by mouth daily    OMEPRAZOLE (PRILOSEC) 40 MG DELAYED RELEASE CAPSULE    TAKE 1 CAPSULE BY MOUTH EVERY DAY AS DIRECTED    SERTRALINE (ZOLOFT) 100 MG TABLET    Take 1 tablet by mouth daily    SERTRALINE (ZOLOFT) 50 MG TABLET    Take 1 tablet by mouth daily       ALLERGIES     Patient has no known allergies. FAMILY HISTORY       Family History   Problem Relation Age of Onset    Cancer Father         pt unsure of kind did not know father well          SOCIAL HISTORY       Social History     Socioeconomic History    Marital status: Single     Spouse name: None    Number of children: None    Years of education: None    Highest education level: None   Occupational History    None   Tobacco Use    Smoking status: Current Every Day Smoker     Packs/day: 0.10     Years: 10.00     Pack years: 1.00     Types: Cigarettes    Smokeless tobacco: Never Used   Vaping Use    Vaping Use: Unknown   Substance and Sexual Activity    Alcohol use: Yes     Comment: drinks liquor socially just ever so often    Drug use: No    Sexual activity: None   Other Topics Concern    None   Social History Narrative    None     Social Determinants of Health     Financial Resource Strain:     Difficulty of Paying Living Expenses: Not on file   Food Insecurity:     Worried About Running Out of Food in the Last Year: Not on file    Anne of Food in the Last Year: Not on file   Transportation Needs:     Lack of Transportation (Medical): Not on file    Lack of Transportation (Non-Medical):  Not on file   Physical Activity:     Days of Exercise per Week: Not on file    Minutes of Exercise per Session: Not on file   Stress:     Feeling of Stress : Not on file   Social Connections:     Frequency of Communication with Friends and Family: Not on file    Frequency of Social Gatherings with Friends and Family: Not on file    Attends Orthodoxy Services: Not on file    Active Member of Clubs or Organizations: Not on file    Attends Club or All other components within normal limits    Narrative:     Performed at:  [address] [facility]  [address] [place], [place] [address], [place] [address]   Phone [phone]   COMPREHENSIVE METABOLIC PANEL - Abnormal; Notable for the following components:    Sodium 134 (*)     Glucose 115 (*)      (*)     AST 42 (*)     All other components within normal limits    Narrative:     Performed at:  [facility]  [address] [place], [place] [address], [place] [address]   Phone [phone]   LACTIC ACID, PLASMA    Narrative:     Performed at:  [address] [place] [address] Laboratory  [address] [place], [place] [address], [place] [address]   Phone [phone]   LIPASE    Narrative:     Performed at:  [address] [place] [address] Laboratory  [address] [place], [place] [address], [place] [address]   Phone [phone]   URINE RT REFLEX TO CULTURE       I have reviewed and interpreted all of the currently available lab resultsfrom this visit (if applicable):  Results for orders placed or performed during the hospital encounter of 11/06/21   CBC Auto Differential   Result Value Ref Range    WBC 10.9 4.0 - 11.0 K/uL    RBC 4.99 3.80 - 5.80 M/uL    Hemoglobin 14.6 11.5 - 16.5 g/dL    Hematocrit 44.6 37.0 - 47.0 %    MCV 89.4 80.0 - 100.0 fL    MCH 29.3 27.0 - 32.0 pg    MCHC 32.7 31.0 - 35.0 g/dL    RDW 12.8 11.0 - 16.0 %    Platelets 004 627 - 277 K/uL    MPV 9.6 6.0 - 10.0 fL    Neutrophils % 71.3 %    Immature Granulocytes % 0.3 0.0 - 5.0 %    Lymphocytes % 20.7 %    Monocytes % 6.6 %    Eosinophils % 0.6 %    Basophils % 0.5 %    Neutrophils Absolute 7.8 (H) 2.0 - 7.5 K/uL    Immature Granulocytes # 0.0 K/uL    Lymphocytes Absolute 2.3 1.5 - 4.0 K/uL    Monocytes Absolute 0.7 0.2 - 0.8 K/uL    Eosinophils Absolute 0.1 0.0 - 0.4 K/uL    Basophils Absolute 0.1 0.0 - 0.1 K/uL   Comprehensive Metabolic Panel   Result Value Ref Range    Sodium 134 (L) 136 - 145 mmol/L    Potassium 4.2 3.4 - 5.1 mmol/L    Chloride 100 98 - 107 mmol/L    CO2 26 20 - 30 mmol/L    Anion Gap 8 3 - 16    Glucose 115 (H) 74 - 106 mg/dL    BUN 11 6 - 20 mg/dL    CREATININE 0.7 0.4 - 1.2 mg/dL    GFR Non-African American >60 >59    GFR African American >59 >59    Calcium 9.1 8.5 - 10.5 mg/dL    Total Protein 7.8 6.4 - 8.3 g/dL    Albumin 4.2 3.4 - 4.8 g/dL    Albumin/Globulin Ratio 1.2 0.8 - 2.0    Total Bilirubin 0.5 0.3 - 1.2 mg/dL    Alkaline Phosphatase 71 25 - 100 U/L     (H) 4 - 36 U/L    AST 42 (H) 8 - 33 U/L    Globulin 3.6 Not Established g/dL   Lactic Acid, Plasma   Result Value Ref Range    Lactic Acid 1.8 0.4 - 2.0 mmol/L   Lipase   Result Value Ref Range    Lipase 15.0 5.6 - 51.3 U/L        All other labs were within normal range or not returned as of this dictation. EMERGENCY DEPARTMENT COURSE and DIFFERENTIAL DIAGNOSIS/MDM:   Vitals:    Vitals:    11/06/21 1753 11/06/21 1754 11/06/21 1755 11/06/21 1756   BP:    (!) 143/80   Pulse:       Resp:       Temp:       TempSrc:       SpO2: 97% 98% 97% 98%   Weight:       Height:         CBC normal  CMP normal except for slightly elevated ALT/AST which is consistent with hepatic steatosis is seen on CT scan    Lactic acid normal    Lipase normal    CT scan shows mild acute uncomplicated sigmoid diverticulitis. Patient will be given oral antibiotics in the emergency department and discharged with a prescription for Levaquin and Flagyl. She will also be given Norco for pain. She will get a prescription for Phenergan for nausea. Patient was given droperidol for her nausea and pain. She had an akathisia reaction which required Benadryl IV. After the patient was given Benadryl, she was feeling much better. We explained to her this is not a true allergy but a reaction that would require her to take Benadryl if she was ever given this medication again. The patient will follow-up with their PCP in 1-2 days for reevaluation.   If the patient or family members have any further concerns or any worsening symptoms they will return to the ED for reevaluation. CONSULTS:  None    PROCEDURES:  Procedures    CRITICAL CARE TIME    Total Critical Care time was 0 minutes, excluding separately reportable procedures. There was a high probability of clinically significant/life threatening deterioration in the patient's condition which required my urgent intervention. FINAL IMPRESSION      1. Sigmoid diverticulitis    2. Drug-induced akathisia          DISPOSITION/PLAN   DISPOSITION        PATIENT REFERRED TO:  Diamante [place], APRN  [address] [facility] [address] [address]  [phone]    Schedule an appointment as soon as possible for a visit in 2 days  As needed      DISCHARGE MEDICATIONS:  New Prescriptions    HYDROCODONE-ACETAMINOPHEN (NORCO) 7.5-325 MG PER TABLET    Take 1 tablet by mouth every 6 hours as needed for Pain for up to 3 days. LEVOFLOXACIN (LEVAQUIN) 500 MG TABLET    Take 1 tablet by mouth daily for 10 days    METRONIDAZOLE (FLAGYL) 500 MG TABLET    Take 1 tablet by mouth 3 times daily for 7 days    PROMETHAZINE (PHENERGAN) 25 MG TABLET    Take 1 tablet by mouth every 6 hours as needed for Nausea       Comment: Please note this report has been produced using speech recognition software and may contain errors related tothat system including errors in grammar, punctuation, and spelling, as well as words and phrases that may be inappropriate. If there are any questions or concerns please feel free to contact the dictating provider forclarification.     Israel Jauregui MD  Attending Emergency Physician                  Israel Jauregui MD  11/06/21 6618

## 2021-11-06 NOTE — ED NOTES
Dc instructions given to patient at this time, patient aware to  rx's tomorrow at her pharmacy. Patient improved at this time after benadryl.      Nga Hernadez, RN  11/06/21 6155

## 2021-11-06 NOTE — ED NOTES
Patient stopped outside the ER, patient still had her iv in place, patient brought back in to exam room. Patient tearful, Dr Arredondo Pencil in to talk with patient, states that she is having anxiety reaction to the droperidol given. Verbal order for benadryl, patient agrees to continue treatment at this time.      Ngozi Cabral RN  11/06/21 2674

## 2021-11-24 NOTE — TELEPHONE ENCOUNTER
Family in [facility] asking about port flush. Informed order is in but RN writer is waiting for pharmacy to verify.   Will there be someone here tomorrow that could see her?

## 2022-09-22 ENCOUNTER — OFFICE VISIT (OUTPATIENT)
Dept: PRIMARY CARE CLINIC | Age: 42
End: 2022-09-22
Payer: MEDICAID

## 2022-09-22 VITALS
WEIGHT: 274 LBS | HEART RATE: 89 BPM | BODY MASS INDEX: 46.78 KG/M2 | HEIGHT: 64 IN | DIASTOLIC BLOOD PRESSURE: 104 MMHG | OXYGEN SATURATION: 97 % | SYSTOLIC BLOOD PRESSURE: 173 MMHG | TEMPERATURE: 97.8 F

## 2022-09-22 DIAGNOSIS — E78.5 HYPERLIPIDEMIA, UNSPECIFIED HYPERLIPIDEMIA TYPE: ICD-10-CM

## 2022-09-22 DIAGNOSIS — E03.9 HYPOTHYROIDISM, UNSPECIFIED TYPE: ICD-10-CM

## 2022-09-22 DIAGNOSIS — M54.2 ACUTE NECK PAIN: Primary | ICD-10-CM

## 2022-09-22 DIAGNOSIS — I10 HYPERTENSION, UNSPECIFIED TYPE: ICD-10-CM

## 2022-09-22 PROCEDURE — 99214 OFFICE O/P EST MOD 30 MIN: CPT | Performed by: NURSE PRACTITIONER

## 2022-09-22 RX ORDER — PREDNISONE 20 MG/1
20 TABLET ORAL 2 TIMES DAILY
Qty: 10 TABLET | Refills: 0 | Status: SHIPPED | OUTPATIENT
Start: 2022-09-22 | End: 2022-09-27

## 2022-09-22 RX ORDER — CYCLOBENZAPRINE HCL 10 MG
10 TABLET ORAL 3 TIMES DAILY PRN
Qty: 30 TABLET | Refills: 0 | Status: SHIPPED | OUTPATIENT
Start: 2022-09-22 | End: 2022-10-02

## 2022-09-22 RX ORDER — CLONIDINE HYDROCHLORIDE 0.1 MG/1
0.1 TABLET ORAL ONCE
Status: COMPLETED | OUTPATIENT
Start: 2022-09-22 | End: 2022-09-22

## 2022-09-22 RX ORDER — KETOROLAC TROMETHAMINE 30 MG/ML
60 INJECTION, SOLUTION INTRAMUSCULAR; INTRAVENOUS ONCE
Status: COMPLETED | OUTPATIENT
Start: 2022-09-22 | End: 2022-09-22

## 2022-09-22 RX ORDER — LISINOPRIL AND HYDROCHLOROTHIAZIDE 20; 12.5 MG/1; MG/1
1 TABLET ORAL DAILY
Qty: 30 TABLET | Refills: 2 | Status: SHIPPED | OUTPATIENT
Start: 2022-09-22

## 2022-09-22 RX ADMIN — KETOROLAC TROMETHAMINE 60 MG: 30 INJECTION, SOLUTION INTRAMUSCULAR; INTRAVENOUS at 14:00

## 2022-09-22 RX ADMIN — CLONIDINE HYDROCHLORIDE 0.1 MG: 0.1 TABLET ORAL at 14:39

## 2022-09-22 ASSESSMENT — PATIENT HEALTH QUESTIONNAIRE - PHQ9
2. FEELING DOWN, DEPRESSED OR HOPELESS: 0
1. LITTLE INTEREST OR PLEASURE IN DOING THINGS: 0
SUM OF ALL RESPONSES TO PHQ QUESTIONS 1-9: 0
SUM OF ALL RESPONSES TO PHQ9 QUESTIONS 1 & 2: 0
SUM OF ALL RESPONSES TO PHQ QUESTIONS 1-9: 0

## 2022-09-22 NOTE — PROGRESS NOTES
SUBJECTIVE:    Patient ID: Selina Romeo is a 38 y. o.female. Chief Complaint   Patient presents with    Neck Pain         HPI:    Pt c/o Neck pain started 4 days ago. Worse on right side. Painful to move neck but can. Does not radiate down arm. No numbness. Not dropping items. No injury or strain. Headache for few days. Reports not taking home medications for 2 years due to not being able to come to doctor and then started school and forgot. She is now a CMA. PMH HTN, HLD, hypothyroid, depression, anxiety, GERD. -200s. No CP, palpitations, SOB, swelling, syncope. Used to take HCTZ, synthroid, zoloft, prilosec. Does not want to restart all meds today and will come back to re-establish care. Patient's medications, allergies, past medical, surgical, social and family histories were reviewed and updated as appropriate in electronic medical record. Outpatient Medications Marked as Taking for the 22 encounter (Office Visit) with GUY Turner CNP   Medication Sig Dispense Refill    omeprazole (PRILOSEC) 40 MG delayed release capsule TAKE 1 CAPSULE BY MOUTH EVERY DAY AS DIRECTED 90 capsule 2        Review of Systems   Musculoskeletal:  Positive for arthralgias, myalgias, neck pain and neck stiffness. Neurological:  Positive for headaches. All other systems reviewed and are negative.     Past Medical History:   Diagnosis Date    Depression     Diverticulitis     Hypertension     Hyperthyroidism      Past Surgical History:   Procedure Laterality Date     SECTION  2002    TONSILLECTOMY  2002     Family History   Problem Relation Age of Onset    Cancer Father         pt unsure of kind did not know father well      Social History     Tobacco Use   Smoking Status Every Day    Packs/day: 0.10    Years: 10.00    Pack years: 1.00    Types: Cigarettes   Smokeless Tobacco Never       OBJECTIVE:   Wt Readings from Last 3 Encounters:   22 274 lb (124.3 kg)   21 275 lb (124.7 kg)   05/18/21 260 lb (117.9 kg)     BP Readings from Last 3 Encounters:   09/22/22 (!) 196/117   11/06/21 (!) 143/80   05/18/21 132/88       BP (!) 173/104   Pulse 89   Temp 97.8 °F (36.6 °C)   Ht 5' 4\" (1.626 m)   Wt 274 lb (124.3 kg)   SpO2 97%   BMI 47.03 kg/m²      Physical Exam  Vitals and nursing note reviewed. Constitutional:       General: She is not in acute distress. Appearance: Normal appearance. She is obese. HENT:      Head: Normocephalic. Nose: Nose normal.   Eyes:      Conjunctiva/sclera: Conjunctivae normal.   Neck:      Comments: LROM due to pain. Spasm noted. Cardiovascular:      Rate and Rhythm: Normal rate and regular rhythm. Pulmonary:      Effort: Pulmonary effort is normal.      Breath sounds: Normal breath sounds. Abdominal:      Tenderness: There is no guarding. Musculoskeletal:         General: No swelling. Normal range of motion. Cervical back: Tenderness present. Lymphadenopathy:      Cervical: No cervical adenopathy. Skin:     General: Skin is warm. Capillary Refill: Capillary refill takes less than 2 seconds. Neurological:      General: No focal deficit present. Mental Status: She is alert and oriented to person, place, and time.    Psychiatric:         Mood and Affect: Mood normal.         Behavior: Behavior normal.       Lab Results   Component Value Date/Time     11/06/2021 04:25 PM    K 4.2 11/06/2021 04:25 PM    K 4.2 09/11/2020 12:54 PM     11/06/2021 04:25 PM    CO2 26 11/06/2021 04:25 PM    GLUCOSE 115 11/06/2021 04:25 PM    BUN 11 11/06/2021 04:25 PM    CREATININE 0.7 11/06/2021 04:25 PM    CALCIUM 9.1 11/06/2021 04:25 PM    PROT 7.8 11/06/2021 04:25 PM    LABALBU 4.2 11/06/2021 04:25 PM    BILITOT 0.5 11/06/2021 04:25 PM     11/06/2021 04:25 PM    AST 42 11/06/2021 04:25 PM       Hemoglobin A1C (%)   Date Value   03/29/2017 5.1     Microscopic Examination (no units)   Date Value   11/06/2021 Not Indicated     LDL Calculated (mg/dL)   Date Value   03/06/2020 218 (H)         Lab Results   Component Value Date/Time    WBC 10.9 11/06/2021 04:25 PM    NEUTROABS 7.8 11/06/2021 04:25 PM    HGB 14.6 11/06/2021 04:25 PM    HCT 44.6 11/06/2021 04:25 PM    MCV 89.4 11/06/2021 04:25 PM     11/06/2021 04:25 PM       Lab Results   Component Value Date    TSH 9.96 (H) 03/06/2020         ASSESSMENT/PLAN:     1. Acute neck pain  -IM toradol today in clinic. Take medications as prescribed. Discussed symptom management. Return to clinic S&S worsen or no improvement noted. Patient verbalized understanding. 2. Hypertension, unspecified type  -Has not had BP meds 2 years. -200s in clinic. NAD.   -clonidine 0.1 x2 given in clinic. Recheck 15 mins SBP 170s. Asymptomatic.   -start lisinopril-hctz daily. Cardiac diet.   -long discussion on medication compliance. Risks discussed. -f/u 1-2 weeks and labs prior to appointment.   -check BP daily, keep log    - TSH; Future  - CBC; Future  - Comprehensive Metabolic Panel; Future  - LIPID PANEL; Future    3. Hypothyroidism, unspecified type  Labs and f/u 1-2 weeks    - TSH; Future  - CBC; Future  - Comprehensive Metabolic Panel; Future  - LIPID PANEL; Future    4. Hyperlipidemia, unspecified hyperlipidemia type  Cardiac, low fat diet  Labs, f/u 1-2 weeks    - LIPID PANEL;  Future        Orders Placed This Encounter   Medications    ketorolac (TORADOL) injection 60 mg    predniSONE (DELTASONE) 20 MG tablet     Sig: Take 1 tablet by mouth 2 times daily for 5 days     Dispense:  10 tablet     Refill:  0    cyclobenzaprine (FLEXERIL) 10 MG tablet     Sig: Take 1 tablet by mouth 3 times daily as needed for Muscle spasms     Dispense:  30 tablet     Refill:  0    lisinopril-hydroCHLOROthiazide (PRINZIDE;ZESTORETIC) 20-12.5 MG per tablet     Sig: Take 1 tablet by mouth daily For blood pressure     Dispense:  30 tablet     Refill:  2    cloNIDine (CATAPRES) tablet 0.1 mg cloNIDine (CATAPRES) tablet 0.1 mg

## 2022-09-22 NOTE — PROGRESS NOTES
Chief Complaint   Patient presents with    Neck Pain       Patient started having neck pain in the right side x 4 days. That is much worse today. She is having problems sleeping due to the pain. She has been using icy hot and heating pad without relief. Patient was taken hctz in the past for her blood pressure. She took thyroid medication also and stopped all meds.           Administrations This Visit       ketorolac (TORADOL) injection 60 mg       Admin Date  09/22/2022  14:00 Action  Given Dose  60 mg Route  IntraMUSCular Site  Dorsogluteal Right Administered By  Ashley Jacobsen    Ordering Provider: GUY Bright CNP    NDC: 86392-760-22    Lot#: [id number]    : ALMAJECT    Patient Supplied?: No

## 2022-09-22 NOTE — PATIENT INSTRUCTIONS
patches by folding them in half so that the sticky sides meet, and then flushing them down a toilet. They should not be placed in the household trash where children or pets can find them. If you have any questions, ask your provider or pharmacist for more information. Be sure to keep all appointments for provider visits or tests. We are committed to providing you with the best care possible. In order to help us achieve these goals please remember to bring all medications, herbal products, and over the counter supplements with you to each visit. If your provider has ordered testing for you, please be sure to follow up with our office if you have not received results within 7 days after the testing took place. *If you receive a survey after visiting one of our offices, please take time to share your experience concerning your physician office visit. These surveys are confidential and no health information about you is shared. We are eager to improve for you and we are counting on your feedback to help make that happen.

## 2022-12-29 RX ORDER — LISINOPRIL AND HYDROCHLOROTHIAZIDE 20; 12.5 MG/1; MG/1
1 TABLET ORAL DAILY
Qty: 30 TABLET | Refills: 2 | OUTPATIENT
Start: 2022-12-29

## 2023-02-24 ENCOUNTER — OFFICE VISIT (OUTPATIENT)
Dept: PRIMARY CARE CLINIC | Age: 43
End: 2023-02-24

## 2023-02-24 VITALS — HEART RATE: 80 BPM | OXYGEN SATURATION: 96 % | TEMPERATURE: 97.8 F

## 2023-02-24 DIAGNOSIS — R05.9 COUGH, UNSPECIFIED TYPE: ICD-10-CM

## 2023-02-24 DIAGNOSIS — J20.9 ACUTE BRONCHITIS, UNSPECIFIED ORGANISM: Primary | ICD-10-CM

## 2023-02-24 DIAGNOSIS — J30.2 SEASONAL ALLERGIES: ICD-10-CM

## 2023-02-24 DIAGNOSIS — Z87.891 PERSONAL HISTORY OF SMOKING: ICD-10-CM

## 2023-02-24 LAB
Lab: ABNORMAL
QC PASS/FAIL: ABNORMAL
SARS-COV-2 RDRP RESP QL NAA+PROBE: NEGATIVE

## 2023-02-24 RX ORDER — METHYLPREDNISOLONE 4 MG/1
TABLET ORAL
Qty: 1 KIT | Refills: 0 | Status: SHIPPED | OUTPATIENT
Start: 2023-02-24 | End: 2023-03-02

## 2023-02-24 RX ORDER — FLUTICASONE PROPIONATE 50 MCG
1 SPRAY, SUSPENSION (ML) NASAL DAILY
Qty: 32 G | Refills: 1 | Status: SHIPPED | OUTPATIENT
Start: 2023-02-24

## 2023-02-24 RX ORDER — DOXYCYCLINE HYCLATE 100 MG
100 TABLET ORAL 2 TIMES DAILY
Qty: 20 TABLET | Refills: 0 | Status: SHIPPED | OUTPATIENT
Start: 2023-02-24 | End: 2023-03-06

## 2023-02-24 RX ORDER — GUAIFENESIN/DEXTROMETHORPHAN 100-10MG/5
5 SYRUP ORAL 3 TIMES DAILY PRN
Qty: 200 ML | Refills: 0 | Status: SHIPPED | OUTPATIENT
Start: 2023-02-24 | End: 2023-03-09

## 2023-02-24 RX ORDER — FLUCONAZOLE 150 MG/1
150 TABLET ORAL
Qty: 2 TABLET | Refills: 0 | Status: SHIPPED | OUTPATIENT
Start: 2023-02-24 | End: 2023-03-02

## 2023-02-24 ASSESSMENT — ENCOUNTER SYMPTOMS
SHORTNESS OF BREATH: 0
EYE PAIN: 0
DIARRHEA: 0
ABDOMINAL PAIN: 0
CONSTIPATION: 0
SORE THROAT: 0
COUGH: 1

## 2023-02-24 NOTE — PROGRESS NOTES
Laura London (:  1980) is a 38 y.o. female,Established patient, here for evaluation of the following chief complaint(s):  Congestion, Otalgia, and Cough (X 2 weeks, patient thinks her symptoms are allergy related. She has a cough, ear pain, watery eyes. She did have some diarrhea last week but is better. She is neg for fever, neg body aches. )           Subjective   SUBJECTIVE/OBJECTIVE:  HPI  Ms. Kimberly Armijo is a pleasant 38year old female with PMH 14 pack years of smoking, HTN, hypothyroidism  and seasonal allergies who presents with 1 week history of cough chest and facial congestion, bilateral otalgia. She denies any fever chills nausea vomiting or diarrhea. She denies any sick contacts. She has not been on her allergy medications in some time. She denies any shortness of air or wheezing. She denies any productive cough. Review of Systems   Constitutional:  Negative for chills, fatigue and fever. HENT:  Positive for congestion and ear pain (bilateral). Negative for nosebleeds and sore throat. Eyes:  Negative for pain and visual disturbance. Respiratory:  Positive for cough. Negative for shortness of breath. Cardiovascular:  Negative for chest pain and palpitations. Gastrointestinal:  Negative for abdominal pain, constipation and diarrhea. Genitourinary:  Negative for difficulty urinating and flank pain. Musculoskeletal:  Negative for arthralgias, gait problem and myalgias. Skin:  Negative for rash. Neurological:  Negative for syncope, light-headedness and headaches. Psychiatric/Behavioral:  Negative for behavioral problems, confusion and dysphoric mood. Objective     Vitals:    23 1522   Pulse: 80   Temp: 97.8 °F (36.6 °C)   SpO2: 96%     Physical Exam  Vitals reviewed. Constitutional:       Appearance: Normal appearance. HENT:      Head: Normocephalic and atraumatic.       Right Ear: Tympanic membrane, ear canal and external ear normal.      Left Ear: Tympanic membrane, ear canal and external ear normal.      Nose: Nose normal.      Mouth/Throat:      Mouth: Mucous membranes are moist.      Pharynx: Oropharynx is clear. Eyes:      Extraocular Movements: Extraocular movements intact. Conjunctiva/sclera: Conjunctivae normal.      Pupils: Pupils are equal, round, and reactive to light. Neck:      Vascular: No carotid bruit. Cardiovascular:      Rate and Rhythm: Normal rate and regular rhythm. Pulses: Normal pulses. Heart sounds: Normal heart sounds. Pulmonary:      Effort: Pulmonary effort is normal.      Breath sounds: Wheezing (b/l bases expiratory) present. Abdominal:      General: Bowel sounds are normal.      Palpations: Abdomen is soft. Musculoskeletal:         General: Normal range of motion. Cervical back: Normal range of motion. Skin:     General: Skin is warm. Findings: No rash. Neurological:      General: No focal deficit present. Mental Status: She is alert and oriented to person, place, and time. Psychiatric:         Mood and Affect: Mood normal.         Thought Content: Thought content normal.               ASSESSMENT/PLAN:  1. Acute bronchitis, unspecified organism  I advised symptomatic treatment with hydration, rest and over-the-counter medicines as needed. - doxycycline hyclate (VIBRA-TABS) 100 MG tablet; Take 1 tablet by mouth 2 times daily for 10 days  Dispense: 20 tablet; Refill: 0  - fluconazole (DIFLUCAN) 150 MG tablet; Take 1 tablet by mouth every 72 hours for 6 days  Dispense: 2 tablet; Refill: 0  - methylPREDNISolone (MEDROL DOSEPACK) 4 MG tablet; Take by mouth. Dispense: 1 kit; Refill: 0  - guaiFENesin-dextromethorphan (ROBITUSSIN DM) 100-10 MG/5ML syrup; Take 5 mLs by mouth 3 times daily as needed for Cough  Dispense: 200 mL; Refill: 0    2. Cough, unspecified type  - POCT COVID-19 Rapid, NAAT was negative    3.  Seasonal allergies  I advised her to restart her over-the-counter antihistamine.  - fluticasone (FLONASE) 50 MCG/ACT nasal spray; 1 spray by Each Nostril route daily  Dispense: 32 g; Refill: 1    4. Personal history of smoking  14 pack years  I counseled patient on importance of smoking cessation for 5 minutes and the resources that are available to aide in cessation. The discussion included the negative health effects of tobacco including but not limited to increased risk of lung cancer, heart disease, peripheral arterial disease and stroke     Return if symptoms worsen or fail to improve. An electronic signature was used to authenticate this note.     --Earl Ryan PA-C

## 2023-02-24 NOTE — PROGRESS NOTES
Chief Complaint   Patient presents with    Congestion    Otalgia    Cough     X 2 weeks, patient thinks her symptoms are allergy related. She has a cough, ear pain, watery eyes. She did have some diarrhea last week but is better. She is neg for fever, neg body aches.

## 2023-06-22 ENCOUNTER — OFFICE VISIT (OUTPATIENT)
Dept: PRIMARY CARE CLINIC | Age: 43
End: 2023-06-22
Payer: MEDICAID

## 2023-06-22 ENCOUNTER — HOSPITAL ENCOUNTER (OUTPATIENT)
Facility: HOSPITAL | Age: 43
Discharge: HOME | End: 2023-06-22
Payer: MEDICAID

## 2023-06-22 VITALS
OXYGEN SATURATION: 97 % | HEART RATE: 90 BPM | DIASTOLIC BLOOD PRESSURE: 110 MMHG | TEMPERATURE: 97.3 F | SYSTOLIC BLOOD PRESSURE: 160 MMHG

## 2023-06-22 DIAGNOSIS — J30.2 SEASONAL ALLERGIES: ICD-10-CM

## 2023-06-22 DIAGNOSIS — E66.01 MORBID OBESITY WITH BMI OF 45.0-49.9, ADULT (HCC): ICD-10-CM

## 2023-06-22 DIAGNOSIS — I10 HYPERTENSION, UNSPECIFIED TYPE: ICD-10-CM

## 2023-06-22 DIAGNOSIS — E03.9 HYPOTHYROIDISM, UNSPECIFIED TYPE: ICD-10-CM

## 2023-06-22 DIAGNOSIS — E78.5 HYPERLIPIDEMIA, UNSPECIFIED HYPERLIPIDEMIA TYPE: ICD-10-CM

## 2023-06-22 DIAGNOSIS — I10 PRIMARY HYPERTENSION: ICD-10-CM

## 2023-06-22 DIAGNOSIS — I10 PRIMARY HYPERTENSION: Primary | ICD-10-CM

## 2023-06-22 DIAGNOSIS — K21.9 GASTROESOPHAGEAL REFLUX DISEASE, UNSPECIFIED WHETHER ESOPHAGITIS PRESENT: ICD-10-CM

## 2023-06-22 DIAGNOSIS — H65.92 MIDDLE EAR EFFUSION, LEFT: ICD-10-CM

## 2023-06-22 LAB
ALBUMIN SERPL-MCNC: 4 G/DL (ref 3.4–4.8)
ALBUMIN/GLOB SERPL: 1.4 {RATIO} (ref 0.8–2)
ALP SERPL-CCNC: 67 U/L (ref 25–100)
ALT SERPL-CCNC: 36 U/L (ref 4–36)
ANION GAP SERPL CALCULATED.3IONS-SCNC: 12 MMOL/L (ref 3–16)
AST SERPL-CCNC: 24 U/L (ref 8–33)
BILIRUB SERPL-MCNC: 0.3 MG/DL (ref 0.3–1.2)
BUN SERPL-MCNC: 20 MG/DL (ref 6–20)
CALCIUM SERPL-MCNC: 9.3 MG/DL (ref 8.5–10.5)
CHLORIDE SERPL-SCNC: 102 MMOL/L (ref 98–107)
CHOLEST SERPL-MCNC: 186 MG/DL (ref 0–200)
CO2 SERPL-SCNC: 22 MMOL/L (ref 20–30)
CREAT SERPL-MCNC: 0.7 MG/DL (ref 0.4–1.2)
ERYTHROCYTE [DISTWIDTH] IN BLOOD BY AUTOMATED COUNT: 12.3 % (ref 11–16)
GFR SERPLBLD CREATININE-BSD FMLA CKD-EPI: >60 ML/MIN/{1.73_M2}
GLOBULIN SER CALC-MCNC: 2.9 G/DL
GLUCOSE SERPL-MCNC: 115 MG/DL (ref 74–106)
HCT VFR BLD AUTO: 42.7 % (ref 37–47)
HDLC SERPL-MCNC: 39 MG/DL (ref 40–60)
HGB BLD-MCNC: 13.9 G/DL (ref 11.5–16.5)
LDLC SERPL CALC-MCNC: 110 MG/DL
MAGNESIUM SERPL-MCNC: 2.1 MG/DL (ref 1.7–2.4)
MCH RBC QN AUTO: 28.3 PG (ref 27–32)
MCHC RBC AUTO-ENTMCNC: 32.6 G/DL (ref 31–35)
MCV RBC AUTO: 86.8 FL (ref 80–100)
PLATELET # BLD AUTO: 267 K/UL (ref 150–400)
PMV BLD AUTO: 10.1 FL (ref 6–10)
POTASSIUM SERPL-SCNC: 4.4 MMOL/L (ref 3.4–5.1)
PROT SERPL-MCNC: 6.9 G/DL (ref 6.4–8.3)
RBC # BLD AUTO: 4.92 M/UL (ref 3.8–5.8)
SODIUM SERPL-SCNC: 136 MMOL/L (ref 136–145)
TRIGL SERPL-MCNC: 183 MG/DL (ref 0–249)
TSH SERPL DL<=0.005 MIU/L-ACNC: 0.02 UIU/ML (ref 0.27–4.2)
VLDLC SERPL CALC-MCNC: 37 MG/DL
WBC # BLD AUTO: 8.3 K/UL (ref 4–11)

## 2023-06-22 PROCEDURE — 84443 ASSAY THYROID STIM HORMONE: CPT

## 2023-06-22 PROCEDURE — 3074F SYST BP LT 130 MM HG: CPT | Performed by: NURSE PRACTITIONER

## 2023-06-22 PROCEDURE — 3078F DIAST BP <80 MM HG: CPT | Performed by: NURSE PRACTITIONER

## 2023-06-22 PROCEDURE — 83735 ASSAY OF MAGNESIUM: CPT

## 2023-06-22 PROCEDURE — 85027 COMPLETE CBC AUTOMATED: CPT

## 2023-06-22 PROCEDURE — 80061 LIPID PANEL: CPT

## 2023-06-22 PROCEDURE — 80053 COMPREHEN METABOLIC PANEL: CPT

## 2023-06-22 PROCEDURE — 99214 OFFICE O/P EST MOD 30 MIN: CPT | Performed by: NURSE PRACTITIONER

## 2023-06-22 RX ORDER — CLONIDINE HYDROCHLORIDE 0.1 MG/1
0.1 TABLET ORAL 2 TIMES DAILY
Status: DISCONTINUED | OUTPATIENT
Start: 2023-06-22 | End: 2023-06-22

## 2023-06-22 RX ORDER — LORATADINE 10 MG/1
10 TABLET ORAL DAILY
Qty: 90 TABLET | Refills: 1 | Status: SHIPPED | OUTPATIENT
Start: 2023-06-22

## 2023-06-22 RX ORDER — FLUTICASONE PROPIONATE 50 MCG
1 SPRAY, SUSPENSION (ML) NASAL DAILY
Qty: 32 G | Refills: 1 | Status: SHIPPED | OUTPATIENT
Start: 2023-06-22

## 2023-06-22 RX ORDER — OMEPRAZOLE 40 MG/1
CAPSULE, DELAYED RELEASE ORAL
Qty: 90 CAPSULE | Refills: 2 | Status: SHIPPED | OUTPATIENT
Start: 2023-06-22

## 2023-06-22 RX ORDER — LISINOPRIL AND HYDROCHLOROTHIAZIDE 20; 12.5 MG/1; MG/1
1 TABLET ORAL DAILY
Qty: 30 TABLET | Refills: 2 | Status: SHIPPED | OUTPATIENT
Start: 2023-06-22 | End: 2023-06-22

## 2023-06-22 RX ORDER — LOSARTAN POTASSIUM 50 MG/1
50 TABLET ORAL DAILY
Qty: 90 TABLET | Refills: 1 | Status: SHIPPED | OUTPATIENT
Start: 2023-06-22

## 2023-06-22 RX ORDER — CLONIDINE HYDROCHLORIDE 0.1 MG/1
TABLET ORAL
Qty: 30 TABLET | Refills: 0 | Status: SHIPPED | OUTPATIENT
Start: 2023-06-22

## 2023-06-22 RX ORDER — CLONIDINE HYDROCHLORIDE 0.1 MG/1
0.1 TABLET ORAL ONCE
Status: SHIPPED | OUTPATIENT
Start: 2023-06-22

## 2023-06-22 RX ADMIN — CLONIDINE HYDROCHLORIDE 0.1 MG: 0.1 TABLET ORAL at 10:00

## 2023-06-22 SDOH — ECONOMIC STABILITY: HOUSING INSECURITY
IN THE LAST 12 MONTHS, WAS THERE A TIME WHEN YOU DID NOT HAVE A STEADY PLACE TO SLEEP OR SLEPT IN A SHELTER (INCLUDING NOW)?: NO

## 2023-06-22 SDOH — ECONOMIC STABILITY: INCOME INSECURITY: HOW HARD IS IT FOR YOU TO PAY FOR THE VERY BASICS LIKE FOOD, HOUSING, MEDICAL CARE, AND HEATING?: NOT VERY HARD

## 2023-06-22 SDOH — ECONOMIC STABILITY: FOOD INSECURITY: WITHIN THE PAST 12 MONTHS, YOU WORRIED THAT YOUR FOOD WOULD RUN OUT BEFORE YOU GOT MONEY TO BUY MORE.: NEVER TRUE

## 2023-06-22 SDOH — ECONOMIC STABILITY: FOOD INSECURITY: WITHIN THE PAST 12 MONTHS, THE FOOD YOU BOUGHT JUST DIDN'T LAST AND YOU DIDN'T HAVE MONEY TO GET MORE.: NEVER TRUE

## 2023-06-22 ASSESSMENT — PATIENT HEALTH QUESTIONNAIRE - PHQ9: DEPRESSION UNABLE TO ASSESS: URGENT/EMERGENT SITUATION

## 2023-06-28 DIAGNOSIS — I10 PRIMARY HYPERTENSION: Primary | ICD-10-CM

## 2023-06-28 RX ORDER — HYDROCHLOROTHIAZIDE 25 MG/1
25 TABLET ORAL EVERY MORNING
Qty: 30 TABLET | Refills: 2 | Status: SHIPPED | OUTPATIENT
Start: 2023-06-28

## 2023-06-28 ASSESSMENT — ENCOUNTER SYMPTOMS
GASTROINTESTINAL NEGATIVE: 1
RESPIRATORY NEGATIVE: 1

## 2023-10-01 DIAGNOSIS — I10 PRIMARY HYPERTENSION: ICD-10-CM

## 2023-10-03 RX ORDER — HYDROCHLOROTHIAZIDE 25 MG/1
25 TABLET ORAL EVERY MORNING
Qty: 30 TABLET | Refills: 2 | Status: SHIPPED | OUTPATIENT
Start: 2023-10-03

## 2023-11-30 RX ORDER — LOSARTAN POTASSIUM 50 MG/1
50 TABLET ORAL DAILY
Qty: 90 TABLET | Refills: 1 | OUTPATIENT
Start: 2023-11-30

## 2023-12-11 RX ORDER — LOSARTAN POTASSIUM 50 MG/1
50 TABLET ORAL DAILY
Qty: 90 TABLET | Refills: 1 | OUTPATIENT
Start: 2023-12-11

## 2024-03-01 RX ORDER — OMEPRAZOLE 40 MG/1
CAPSULE, DELAYED RELEASE ORAL
Qty: 90 CAPSULE | Refills: 2 | OUTPATIENT
Start: 2024-03-01

## 2024-04-15 RX ORDER — OMEPRAZOLE 40 MG/1
CAPSULE, DELAYED RELEASE ORAL
Qty: 90 CAPSULE | Refills: 2 | OUTPATIENT
Start: 2024-04-15

## 2024-05-13 ENCOUNTER — APPOINTMENT (OUTPATIENT)
Dept: GENERAL RADIOLOGY | Facility: HOSPITAL | Age: 44
End: 2024-05-13
Attending: EMERGENCY MEDICINE
Payer: MEDICAID

## 2024-05-13 ENCOUNTER — HOSPITAL ENCOUNTER (EMERGENCY)
Facility: HOSPITAL | Age: 44
Discharge: HOME | End: 2024-05-13
Attending: EMERGENCY MEDICINE
Payer: MEDICAID

## 2024-05-13 ENCOUNTER — APPOINTMENT (OUTPATIENT)
Dept: CT IMAGING | Facility: HOSPITAL | Age: 44
End: 2024-05-13
Attending: EMERGENCY MEDICINE
Payer: MEDICAID

## 2024-05-13 VITALS
SYSTOLIC BLOOD PRESSURE: 163 MMHG | TEMPERATURE: 98.1 F | HEART RATE: 87 BPM | RESPIRATION RATE: 16 BRPM | DIASTOLIC BLOOD PRESSURE: 111 MMHG | BODY MASS INDEX: 46.1 KG/M2 | HEIGHT: 64 IN | WEIGHT: 270 LBS | OXYGEN SATURATION: 96 %

## 2024-05-13 DIAGNOSIS — Z76.0 ENCOUNTER FOR MEDICATION REFILL: ICD-10-CM

## 2024-05-13 DIAGNOSIS — I10 UNCONTROLLED HYPERTENSION: Primary | ICD-10-CM

## 2024-05-13 LAB
ALBUMIN SERPL-MCNC: 4 G/DL (ref 3.4–4.8)
ALBUMIN/GLOB SERPL: 1.3 {RATIO} (ref 0.8–2)
ALP SERPL-CCNC: 55 U/L (ref 25–100)
ALT SERPL-CCNC: 27 U/L (ref 4–36)
ANION GAP SERPL CALCULATED.3IONS-SCNC: 10 MMOL/L (ref 3–16)
AST SERPL-CCNC: 17 U/L (ref 8–33)
BASOPHILS # BLD: 0.1 K/UL (ref 0–0.1)
BASOPHILS NFR BLD: 0.7 %
BILIRUB SERPL-MCNC: <0.2 MG/DL (ref 0.3–1.2)
BUN SERPL-MCNC: 15 MG/DL (ref 6–20)
CALCIUM SERPL-MCNC: 9.1 MG/DL (ref 8.5–10.5)
CHLORIDE SERPL-SCNC: 103 MMOL/L (ref 98–107)
CO2 SERPL-SCNC: 22 MMOL/L (ref 20–30)
CREAT SERPL-MCNC: 0.7 MG/DL (ref 0.4–1.2)
EOSINOPHIL # BLD: 0.2 K/UL (ref 0–0.4)
EOSINOPHIL NFR BLD: 2.3 %
ERYTHROCYTE [DISTWIDTH] IN BLOOD BY AUTOMATED COUNT: 12.5 % (ref 11–16)
GFR SERPLBLD CREATININE-BSD FMLA CKD-EPI: >90 ML/MIN/{1.73_M2}
GLOBULIN SER CALC-MCNC: 3 G/DL
GLUCOSE SERPL-MCNC: 101 MG/DL (ref 74–106)
HCT VFR BLD AUTO: 41.2 % (ref 37–47)
HGB BLD-MCNC: 13.6 G/DL (ref 11.5–16.5)
IMM GRANULOCYTES # BLD: 0 K/UL
IMM GRANULOCYTES NFR BLD: 0.3 % (ref 0–5)
LYMPHOCYTES # BLD: 2.7 K/UL (ref 1.5–4)
LYMPHOCYTES NFR BLD: 36.1 %
MAGNESIUM SERPL-MCNC: 2.1 MG/DL (ref 1.7–2.4)
MCH RBC QN AUTO: 28.7 PG (ref 27–32)
MCHC RBC AUTO-ENTMCNC: 33 G/DL (ref 31–35)
MCV RBC AUTO: 86.9 FL (ref 80–100)
MONOCYTES # BLD: 0.5 K/UL (ref 0.2–0.8)
MONOCYTES NFR BLD: 6.1 %
NEUTROPHILS # BLD: 4.1 K/UL (ref 2–7.5)
NEUTS SEG NFR BLD: 54.5 %
PLATELET # BLD AUTO: 268 K/UL (ref 150–400)
PMV BLD AUTO: 9.1 FL (ref 6–10)
POTASSIUM SERPL-SCNC: 4.1 MMOL/L (ref 3.4–5.1)
PROT SERPL-MCNC: 7 G/DL (ref 6.4–8.3)
RBC # BLD AUTO: 4.74 M/UL (ref 3.8–5.8)
SODIUM SERPL-SCNC: 135 MMOL/L (ref 136–145)
WBC # BLD AUTO: 7.4 K/UL (ref 4–11)

## 2024-05-13 PROCEDURE — 36415 COLL VENOUS BLD VENIPUNCTURE: CPT

## 2024-05-13 PROCEDURE — 96375 TX/PRO/DX INJ NEW DRUG ADDON: CPT

## 2024-05-13 PROCEDURE — 85025 COMPLETE CBC W/AUTO DIFF WBC: CPT

## 2024-05-13 PROCEDURE — 80053 COMPREHEN METABOLIC PANEL: CPT

## 2024-05-13 PROCEDURE — 6360000002 HC RX W HCPCS: Performed by: FAMILY MEDICINE

## 2024-05-13 PROCEDURE — 6370000000 HC RX 637 (ALT 250 FOR IP): Performed by: EMERGENCY MEDICINE

## 2024-05-13 PROCEDURE — 70450 CT HEAD/BRAIN W/O DYE: CPT

## 2024-05-13 PROCEDURE — 99285 EMERGENCY DEPT VISIT HI MDM: CPT

## 2024-05-13 PROCEDURE — 96374 THER/PROPH/DIAG INJ IV PUSH: CPT

## 2024-05-13 PROCEDURE — 83735 ASSAY OF MAGNESIUM: CPT

## 2024-05-13 PROCEDURE — 71045 X-RAY EXAM CHEST 1 VIEW: CPT

## 2024-05-13 RX ORDER — DEXAMETHASONE SODIUM PHOSPHATE 10 MG/ML
10 INJECTION INTRAMUSCULAR; INTRAVENOUS ONCE
Status: COMPLETED | OUTPATIENT
Start: 2024-05-13 | End: 2024-05-13

## 2024-05-13 RX ORDER — LOSARTAN POTASSIUM 50 MG/1
50 TABLET ORAL DAILY
Qty: 30 TABLET | Refills: 0 | Status: SHIPPED | OUTPATIENT
Start: 2024-05-13 | End: 2024-06-12

## 2024-05-13 RX ORDER — LOSARTAN POTASSIUM 50 MG/1
50 TABLET ORAL ONCE
Status: COMPLETED | OUTPATIENT
Start: 2024-05-13 | End: 2024-05-13

## 2024-05-13 RX ORDER — KETOROLAC TROMETHAMINE 30 MG/ML
30 INJECTION, SOLUTION INTRAMUSCULAR; INTRAVENOUS ONCE
Status: COMPLETED | OUTPATIENT
Start: 2024-05-13 | End: 2024-05-13

## 2024-05-13 RX ADMIN — KETOROLAC TROMETHAMINE 30 MG: 30 INJECTION, SOLUTION INTRAMUSCULAR; INTRAVENOUS at 19:28

## 2024-05-13 RX ADMIN — DEXAMETHASONE SODIUM PHOSPHATE 10 MG: 10 INJECTION INTRAMUSCULAR; INTRAVENOUS at 19:28

## 2024-05-13 RX ADMIN — LOSARTAN POTASSIUM 50 MG: 50 TABLET, FILM COATED ORAL at 18:57

## 2024-05-13 ASSESSMENT — LIFESTYLE VARIABLES
HOW MANY STANDARD DRINKS CONTAINING ALCOHOL DO YOU HAVE ON A TYPICAL DAY: PATIENT DOES NOT DRINK
HOW OFTEN DO YOU HAVE A DRINK CONTAINING ALCOHOL: NEVER

## 2024-05-13 NOTE — ED PROVIDER NOTES
Emergency Department Encounter  Location: [facility] EMERGENCY DEPARTMENT    Patient: Serena Martínez  MRN: [id number]  : 1980  Date of evaluation: 2024  ED Provider: Mario Whipple MD    19:00p.m.  Serena Martínez was checked out to me by Dr. Rice. Please see his/her initial documentation for details of the patient's initial ED presentation, physical exam and completed studies.    In brief, Serena Martínez is a 43 y.o. female that presented to the emergency department complaining of having had a posterior headache for the last 3 days that she has not been able to get rid of that she finally took her blood pressure last night and found that it was 215/190 when she checked it she has been out of her losartan 50 mg which she once a day for about a week now she has not taken anything for the headache today she also today noticed some numbness of the entire left side of her face but that was after she had been lying on it taking a nap she also has had some nausea but no vomiting no other symptoms.     I have reviewed and interpreted all of the currently available lab results and diagnostics from this visit:  Results for orders placed or performed during the hospital encounter of 24   CBC with Auto Differential   Result Value Ref Range    WBC 7.4 4.0 - 11.0 K/uL    RBC 4.74 3.80 - 5.80 M/uL    Hemoglobin 13.6 11.5 - 16.5 g/dL    Hematocrit 41.2 37.0 - 47.0 %    MCV 86.9 80.0 - 100.0 fL    MCH 28.7 27.0 - 32.0 pg    MCHC 33.0 31.0 - 35.0 g/dL    RDW 12.5 11.0 - 16.0 %    Platelets 268 150 - 400 K/uL    MPV 9.1 6.0 - 10.0 fL    Neutrophils % 54.5 %    Immature Granulocytes % 0.3 0.0 - 5.0 %    Lymphocytes % 36.1 %    Monocytes % 6.1 %    Eosinophils % 2.3 %    Basophils % 0.7 %    Neutrophils Absolute 4.1 2.0 - 7.5 K/uL    Immature Granulocytes # 0.0 K/uL    Lymphocytes Absolute 2.7 1.5 - 4.0 K/uL    Monocytes Absolute 0.5 0.2 - 0.8 K/uL    Eosinophils Absolute 0.2 0.0 - 0.4 K/uL    
   OMEPRAZOLE (PRILOSEC) 40 MG DELAYED RELEASE CAPSULE    TAKE 1 CAPSULE BY MOUTH EVERY DAY AS DIRECTED       ALLERGIES     Patient has no known allergies.    FAMILYHISTORY       Family History   Problem Relation Age of Onset    Cancer Father         pt unsure of kind did not know father well        SOCIAL HISTORY       Social History     Tobacco Use    Smoking status: Every Day     Current packs/day: 0.10     Average packs/day: 0.1 packs/day for 10.0 years (1.0 ttl pk-yrs)     Types: Cigarettes    Smokeless tobacco: Never   Vaping Use    Vaping Use: Unknown   Substance Use Topics    Alcohol use: Yes     Comment: drinks liquor socially just ever so often    Drug use: No       SCREENINGS        [place] Coma Scale  Eye Opening: Spontaneous  Best Verbal Response: Oriented  Best Motor Response: Obeys commands  [place] Coma Scale Score: 15                CIWA Assessment  BP: (!) 189/115  Pulse: 87           PHYSICAL EXAM  1 or more Elements     ED Triage Vitals [05/13/24 1808]   BP Temp Temp Source Pulse Respirations SpO2 Height Weight - Scale   (!) 189/115 98.1 °F (36.7 °C) Oral 87 16 96 % 1.626 m (5' 4\") 122.5 kg (270 lb)       Physical Exam    My pulse oximetry interpretation is 96%which is within the normal range    GENERAL APPEARANCE: Awake and alert. Cooperative. No acute distress.  HEAD:  Atraumatic.  EYES: EOM's grossly intact.   ENT: Mucous membranes are moist.  No trismus.  NECK:  Trachea midline.  No adenopathy  HEART: Regular rate and rhythm  LUNGS: Respirations unlabored. CTAB  ABDOMEN: Soft. Non-tender. No guarding or rebound.  EXTREMITIES: No acute deformities.  SKIN: Warm and dry.  NEUROLOGICAL: Alert and oriented x 3 cranial nerves II through XII intact motor and sensory intact all 4 extremities  PSYCHIATRIC: Normal mood.        DIAGNOSTIC RESULTS   LABS:    Labs Reviewed   CBC WITH AUTO DIFFERENTIAL   COMPREHENSIVE METABOLIC PANEL   MAGNESIUM   URINALYSIS WITH REFLEX TO CULTURE       When ordered only

## 2024-05-13 NOTE — ED TRIAGE NOTES
Patient with complaints of hypertension. Patient states that she has been out of medication for 1 week and has been unable to get it. Patient states that her head started hurting 3 days ago. Patient take losartan 50mg daily and has been unable to get it.

## 2024-07-24 ENCOUNTER — HOSPITAL ENCOUNTER (EMERGENCY)
Facility: HOSPITAL | Age: 44
Discharge: HOME | End: 2024-07-24
Attending: EMERGENCY MEDICINE
Payer: MEDICAID

## 2024-07-24 ENCOUNTER — APPOINTMENT (OUTPATIENT)
Dept: CT IMAGING | Facility: HOSPITAL | Age: 44
End: 2024-07-24
Attending: EMERGENCY MEDICINE
Payer: MEDICAID

## 2024-07-24 VITALS
OXYGEN SATURATION: 98 % | BODY MASS INDEX: 47.2 KG/M2 | HEART RATE: 87 BPM | WEIGHT: 275 LBS | TEMPERATURE: 98.5 F | DIASTOLIC BLOOD PRESSURE: 116 MMHG | SYSTOLIC BLOOD PRESSURE: 169 MMHG | RESPIRATION RATE: 16 BRPM

## 2024-07-24 DIAGNOSIS — R10.32 ABDOMINAL PAIN, LEFT LOWER QUADRANT: ICD-10-CM

## 2024-07-24 DIAGNOSIS — D25.9 UTERINE LEIOMYOMA, UNSPECIFIED LOCATION: Primary | ICD-10-CM

## 2024-07-24 LAB
ALBUMIN SERPL-MCNC: 3.8 G/DL (ref 3.4–4.8)
ALP SERPL-CCNC: 49 U/L (ref 25–100)
ALT SERPL-CCNC: 26 U/L (ref 4–36)
ANION GAP SERPL CALCULATED.3IONS-SCNC: 9 MMOL/L (ref 3–16)
AST SERPL-CCNC: 20 U/L (ref 8–33)
BASOPHILS # BLD: 0 K/UL (ref 0–0.1)
BASOPHILS NFR BLD: 0.4 %
BILIRUB DIRECT SERPL-MCNC: <0.2 MG/DL (ref 0–0.2)
BILIRUB INDIRECT SERPL-MCNC: ABNORMAL MG/DL (ref 0.2–0.8)
BILIRUB SERPL-MCNC: <0.2 MG/DL (ref 0.3–1.2)
BILIRUB UR QL STRIP.AUTO: NEGATIVE
BUN SERPL-MCNC: 14 MG/DL (ref 6–20)
CALCIUM SERPL-MCNC: 9.2 MG/DL (ref 8.5–10.5)
CHLORIDE SERPL-SCNC: 104 MMOL/L (ref 98–107)
CLARITY UR: CLEAR
CO2 SERPL-SCNC: 25 MMOL/L (ref 20–30)
COLOR UR: YELLOW
CREAT SERPL-MCNC: 0.8 MG/DL (ref 0.4–1.2)
EOSINOPHIL # BLD: 0.1 K/UL (ref 0–0.4)
EOSINOPHIL NFR BLD: 1.4 %
ERYTHROCYTE [DISTWIDTH] IN BLOOD BY AUTOMATED COUNT: 12.9 % (ref 11–16)
GFR SERPLBLD CREATININE-BSD FMLA CKD-EPI: >90 ML/MIN/{1.73_M2}
GLUCOSE SERPL-MCNC: 102 MG/DL (ref 74–106)
GLUCOSE UR STRIP.AUTO-MCNC: NEGATIVE MG/DL
HCG UR QL: NEGATIVE
HCT VFR BLD AUTO: 40.5 % (ref 37–47)
HGB BLD-MCNC: 13.4 G/DL (ref 11.5–16.5)
HGB UR QL STRIP.AUTO: NEGATIVE
IMM GRANULOCYTES # BLD: 0.1 K/UL
IMM GRANULOCYTES NFR BLD: 0.5 % (ref 0–5)
KETONES UR STRIP.AUTO-MCNC: NEGATIVE MG/DL
LEUKOCYTE ESTERASE UR QL STRIP.AUTO: NEGATIVE
LYMPHOCYTES # BLD: 3.5 K/UL (ref 1.5–4)
LYMPHOCYTES NFR BLD: 34 %
MCH RBC QN AUTO: 29.1 PG (ref 27–32)
MCHC RBC AUTO-ENTMCNC: 33.1 G/DL (ref 31–35)
MCV RBC AUTO: 87.9 FL (ref 80–100)
MONOCYTES # BLD: 0.6 K/UL (ref 0.2–0.8)
MONOCYTES NFR BLD: 5.9 %
NEUTROPHILS # BLD: 5.9 K/UL (ref 2–7.5)
NEUTS SEG NFR BLD: 57.8 %
NITRITE UR QL STRIP.AUTO: NEGATIVE
PH UR STRIP.AUTO: 6 [PH] (ref 5–8)
PLATELET # BLD AUTO: 283 K/UL (ref 150–400)
PMV BLD AUTO: 9.4 FL (ref 6–10)
POTASSIUM SERPL-SCNC: 4.2 MMOL/L (ref 3.4–5.1)
PROT SERPL-MCNC: 6.9 G/DL (ref 6.4–8.3)
PROT UR STRIP.AUTO-MCNC: NEGATIVE MG/DL
RBC # BLD AUTO: 4.61 M/UL (ref 3.8–5.8)
SODIUM SERPL-SCNC: 138 MMOL/L (ref 136–145)
SP GR UR STRIP.AUTO: 1.02 (ref 1–1.03)
UA COMPLETE W REFLEX CULTURE PNL UR: NORMAL
UA DIPSTICK W REFLEX MICRO PNL UR: NORMAL
URN SPEC COLLECT METH UR: NORMAL
UROBILINOGEN UR STRIP-ACNC: 0.2 E.U./DL
WBC # BLD AUTO: 10.2 K/UL (ref 4–11)

## 2024-07-24 PROCEDURE — 80048 BASIC METABOLIC PNL TOTAL CA: CPT

## 2024-07-24 PROCEDURE — 80076 HEPATIC FUNCTION PANEL: CPT

## 2024-07-24 PROCEDURE — 99285 EMERGENCY DEPT VISIT HI MDM: CPT

## 2024-07-24 PROCEDURE — 81003 URINALYSIS AUTO W/O SCOPE: CPT

## 2024-07-24 PROCEDURE — 6360000002 HC RX W HCPCS: Performed by: EMERGENCY MEDICINE

## 2024-07-24 PROCEDURE — 85025 COMPLETE CBC W/AUTO DIFF WBC: CPT

## 2024-07-24 PROCEDURE — 36415 COLL VENOUS BLD VENIPUNCTURE: CPT

## 2024-07-24 PROCEDURE — 6360000004 HC RX CONTRAST MEDICATION: Performed by: EMERGENCY MEDICINE

## 2024-07-24 PROCEDURE — 84703 CHORIONIC GONADOTROPIN ASSAY: CPT

## 2024-07-24 PROCEDURE — 96374 THER/PROPH/DIAG INJ IV PUSH: CPT

## 2024-07-24 PROCEDURE — 74177 CT ABD & PELVIS W/CONTRAST: CPT

## 2024-07-24 PROCEDURE — 2580000003 HC RX 258: Performed by: EMERGENCY MEDICINE

## 2024-07-24 RX ORDER — 0.9 % SODIUM CHLORIDE 0.9 %
1000 INTRAVENOUS SOLUTION INTRAVENOUS ONCE
Status: COMPLETED | OUTPATIENT
Start: 2024-07-24 | End: 2024-07-24

## 2024-07-24 RX ORDER — KETOROLAC TROMETHAMINE 15 MG/ML
15 INJECTION, SOLUTION INTRAMUSCULAR; INTRAVENOUS ONCE
Status: COMPLETED | OUTPATIENT
Start: 2024-07-24 | End: 2024-07-24

## 2024-07-24 RX ADMIN — IOPAMIDOL 100 ML: 755 INJECTION, SOLUTION INTRAVENOUS at 22:22

## 2024-07-24 RX ADMIN — KETOROLAC TROMETHAMINE 15 MG: 15 INJECTION, SOLUTION INTRAMUSCULAR; INTRAVENOUS at 22:11

## 2024-07-24 RX ADMIN — SODIUM CHLORIDE 1000 ML: 9 INJECTION, SOLUTION INTRAVENOUS at 22:12

## 2024-07-24 ASSESSMENT — ENCOUNTER SYMPTOMS
NAUSEA: 0
CONSTIPATION: 0
EYE PAIN: 0
DIARRHEA: 0
COUGH: 0
SHORTNESS OF BREATH: 0
BACK PAIN: 0
EYE REDNESS: 0
ABDOMINAL PAIN: 1
VOMITING: 0
RHINORRHEA: 0

## 2024-07-25 NOTE — ED PROVIDER NOTES
[facility]  EMERGENCY DEPARTMENT ENCOUNTER        Pt Name: Serena Martínez  MRN: [id number]  Birthdate 1980  Date of evaluation: 7/24/2024  Provider: Jens Rosas DO  PCP: Ileana Pabon APRN  Note Started: 11:02 PM EDT 7/24/24    CHIEF COMPLAINT      Abdominal Pain    HISTORY OF PRESENT ILLNESS: 1 or more Elements     Chief Complaint   Patient presents with    Abdominal Pain     LLQ x2 days, pt states she has hx of diverticulitis      History from : Patient  Limitations to history : None    Serena Martínez is a 43 y.o. female who presents to the emergency department secondary to concern for left lower quadrant abdominal pain.  States it has been on for 2 days.  Reports history of diverticulitis and this feels similar so she wanted to get checked for that.  She has not taken any medication for pain relief prior to arrival.  Denies any nausea/vomiting or constipation or diarrhea.    Past medical history noted below. Aside from what is stated above denies any other symptoms or modifying factors.   reports that she has been smoking cigarettes. She has a 1.0 pack-year smoking history. She has never used smokeless tobacco. She reports current alcohol use. She reports that she does not use drugs.  Nursing Notes were all reviewed and agreed with or any disagreements addressed in HPI/MDM.  REVIEW OF SYSTEMS :    Review of Systems   Constitutional:  Negative for chills and fever.   HENT:  Negative for congestion and rhinorrhea.    Eyes:  Negative for pain and redness.   Respiratory:  Negative for cough and shortness of breath.    Cardiovascular:  Negative for chest pain and leg swelling.   Gastrointestinal:  Positive for abdominal pain. Negative for constipation, diarrhea, nausea and vomiting.   Genitourinary:  Negative for frequency and urgency.   Musculoskeletal:  Negative for back pain and neck pain.   Skin:  Negative for rash.   Neurological:  Negative for facial

## 2024-07-31 ENCOUNTER — TRANSCRIBE ORDERS (OUTPATIENT)
Dept: ADMINISTRATIVE | Age: 44
End: 2024-07-31

## 2024-07-31 DIAGNOSIS — E04.1 THYROID NODULE: Primary | ICD-10-CM

## 2024-08-02 ENCOUNTER — TRANSCRIBE ORDERS (OUTPATIENT)
Dept: GENERAL RADIOLOGY | Facility: HOSPITAL | Age: 44
End: 2024-08-02

## 2024-08-02 DIAGNOSIS — Z12.31 ENCOUNTER FOR SCREENING MAMMOGRAM FOR MALIGNANT NEOPLASM OF BREAST: Primary | ICD-10-CM

## 2024-08-06 ENCOUNTER — HOSPITAL ENCOUNTER (OUTPATIENT)
Dept: ULTRASOUND IMAGING | Facility: HOSPITAL | Age: 44
Discharge: HOME | End: 2024-08-06
Payer: MEDICAID

## 2024-08-06 ENCOUNTER — HOSPITAL ENCOUNTER (OUTPATIENT)
Dept: MAMMOGRAPHY | Facility: HOSPITAL | Age: 44
Discharge: HOME | End: 2024-08-06
Payer: MEDICAID

## 2024-08-06 VITALS — WEIGHT: 277 LBS | HEIGHT: 64 IN | BODY MASS INDEX: 47.29 KG/M2

## 2024-08-06 DIAGNOSIS — Z12.31 ENCOUNTER FOR SCREENING MAMMOGRAM FOR MALIGNANT NEOPLASM OF BREAST: ICD-10-CM

## 2024-08-06 DIAGNOSIS — E04.1 THYROID NODULE: ICD-10-CM

## 2024-08-06 PROCEDURE — 77063 BREAST TOMOSYNTHESIS BI: CPT

## 2024-08-06 PROCEDURE — 76536 US EXAM OF HEAD AND NECK: CPT

## 2024-08-07 LAB — ECHO BSA: 2.38 M2

## 2024-10-06 ENCOUNTER — APPOINTMENT (OUTPATIENT)
Dept: CT IMAGING | Facility: HOSPITAL | Age: 44
End: 2024-10-06
Attending: HOSPITALIST
Payer: MEDICAID

## 2024-10-06 ENCOUNTER — HOSPITAL ENCOUNTER (EMERGENCY)
Facility: HOSPITAL | Age: 44
Discharge: HOME | End: 2024-10-06
Attending: HOSPITALIST
Payer: MEDICAID

## 2024-10-06 VITALS
HEART RATE: 81 BPM | DIASTOLIC BLOOD PRESSURE: 112 MMHG | OXYGEN SATURATION: 96 % | WEIGHT: 277 LBS | SYSTOLIC BLOOD PRESSURE: 176 MMHG | BODY MASS INDEX: 47.29 KG/M2 | RESPIRATION RATE: 16 BRPM | HEIGHT: 64 IN | TEMPERATURE: 98.3 F

## 2024-10-06 DIAGNOSIS — R30.0 DYSURIA: Primary | ICD-10-CM

## 2024-10-06 DIAGNOSIS — R10.9 LEFT FLANK PAIN: ICD-10-CM

## 2024-10-06 LAB
BACTERIA URNS QL MICRO: ABNORMAL /HPF
BILIRUB UR QL STRIP.AUTO: NEGATIVE
CLARITY UR: ABNORMAL
COLOR UR: YELLOW
EPI CELLS #/AREA URNS HPF: ABNORMAL /HPF (ref 0–5)
GLUCOSE UR STRIP.AUTO-MCNC: NEGATIVE MG/DL
HGB UR QL STRIP.AUTO: ABNORMAL
KETONES UR STRIP.AUTO-MCNC: NEGATIVE MG/DL
LEUKOCYTE ESTERASE UR QL STRIP.AUTO: NEGATIVE
NITRITE UR QL STRIP.AUTO: NEGATIVE
PH UR STRIP.AUTO: 6.5 [PH] (ref 5–8)
PROT UR STRIP.AUTO-MCNC: NEGATIVE MG/DL
RBC #/AREA URNS HPF: ABNORMAL /HPF (ref 0–4)
SP GR UR STRIP.AUTO: >=1.03 (ref 1–1.03)
UA COMPLETE W REFLEX CULTURE PNL UR: ABNORMAL
UA DIPSTICK W REFLEX MICRO PNL UR: YES
URN SPEC COLLECT METH UR: ABNORMAL
UROBILINOGEN UR STRIP-ACNC: 2 E.U./DL
WBC #/AREA URNS HPF: ABNORMAL /HPF (ref 0–5)

## 2024-10-06 PROCEDURE — 81001 URINALYSIS AUTO W/SCOPE: CPT

## 2024-10-06 PROCEDURE — 74176 CT ABD & PELVIS W/O CONTRAST: CPT

## 2024-10-06 PROCEDURE — 99284 EMERGENCY DEPT VISIT MOD MDM: CPT

## 2024-10-06 RX ORDER — PHENAZOPYRIDINE HYDROCHLORIDE 200 MG/1
200 TABLET, FILM COATED ORAL 3 TIMES DAILY PRN
Qty: 9 TABLET | Refills: 0 | Status: SHIPPED | OUTPATIENT
Start: 2024-10-06 | End: 2024-10-09

## 2024-10-06 ASSESSMENT — PAIN DESCRIPTION - DESCRIPTORS: DESCRIPTORS: BURNING

## 2024-10-06 ASSESSMENT — PAIN DESCRIPTION - LOCATION: LOCATION: BACK;PELVIS

## 2024-10-06 ASSESSMENT — PAIN DESCRIPTION - ORIENTATION: ORIENTATION: LEFT

## 2024-10-06 ASSESSMENT — PAIN SCALES - GENERAL: PAINLEVEL_OUTOF10: 7

## 2024-10-06 ASSESSMENT — PAIN - FUNCTIONAL ASSESSMENT: PAIN_FUNCTIONAL_ASSESSMENT: 0-10

## 2024-10-07 NOTE — ED TRIAGE NOTES
Patient presents today with dysuria for one week. She complains of lower back pain, worse on the left. She also has burning when urinates. She is urinating as much as usual. She denies any vaginal discharge, foul smelling urine, blood, or pus. She has been taking Azo, but has not had none today. She rates the pain 7/10.

## 2024-10-07 NOTE — ED PROVIDER NOTES
[facility]  EMERGENCY DEPARTMENT ENCOUNTER        Pt Name: Serena Martínez  MRN: [id number]  Birthdate 1980  Date of evaluation: 10/6/2024  Provider: Castillo Bradshaw DO  PCP: Ileana Pabon APRN  Note Started: 8:52 PM EDT 10/6/24    CHIEF COMPLAINT       Chief Complaint   Patient presents with    Dysuria       HISTORY OF PRESENT ILLNESS: 1 or more Elements     History from : Patient    Limitations to history : None    Serena Martínez is a 44 y.o. female who presents to Emergency Department with dysuria.  Patient states that she has been having symptoms for a little over a week.  She has been taking some over-the-counter Azo she states her last dose she believes was yesterday.  She has developed some left flank pain or discomfort with urination.  She describes it more as a dull achy sensation.  Rates it as a 7 out of 10.  She states it only hurts when she urinates.  She denies fall trauma or injury to the area.  Denies any nausea or diarrhea.  Patient denies any abdominal pain.  She denies any increased or decreased urinary frequency.  Denies any foul odor to urine.  Only complaint is really just the dysuria and pain to the flank with urination itself.  Denies fevers chills.  She states she has had urinary tract infections before but she is never really hurt in the flank area before but she advised that she really has not never waited over a week before seeking any type of evaluation.  She states that she was just [place] reason why she has been putting it off.  She states she has an appointment with her primary care provider tomorrow but she was just concerned because of the discomfort so she decided to come to the emergency department for evaluation.  She denies any other complaint at this time.  Past medical history is pertinent for depression, diverticulitis, hypertension and hyperthyroidism    Nursing Notes were all reviewed and agreed with or any disagreements were

## 2024-10-07 NOTE — ED NOTES
Discharge instructions and medications discussed with patient, verbalizes understanding. Patient had CT and UA, declined lab work up. Blood pressure is elevated. Patient reports that she has high blood pressure and has taken her daily medications. She also reports that when she is in pain her blood pressure raises more than usual. Dr. Bradshaw is aware, and agreeable to patient continuing current blood pressure medications and following up with PCP. Patient is alert and oriented x 4, no further questions or concerns. Patient discharged home via POV.

## 2024-10-23 ENCOUNTER — OFFICE VISIT (OUTPATIENT)
Dept: OBSTETRICS AND GYNECOLOGY | Facility: CLINIC | Age: 44
End: 2024-10-23
Payer: COMMERCIAL

## 2024-10-23 VITALS
WEIGHT: 273.2 LBS | SYSTOLIC BLOOD PRESSURE: 130 MMHG | BODY MASS INDEX: 46.64 KG/M2 | DIASTOLIC BLOOD PRESSURE: 90 MMHG | HEIGHT: 64 IN

## 2024-10-23 DIAGNOSIS — D25.9 UTERINE LEIOMYOMA, UNSPECIFIED LOCATION: ICD-10-CM

## 2024-10-23 DIAGNOSIS — N92.1 MENORRHAGIA WITH IRREGULAR CYCLE: ICD-10-CM

## 2024-10-23 DIAGNOSIS — Z01.419 WELL WOMAN EXAM WITH ROUTINE GYNECOLOGICAL EXAM: Primary | ICD-10-CM

## 2024-10-23 DIAGNOSIS — Z12.4 SCREENING FOR CERVICAL CANCER: ICD-10-CM

## 2024-10-23 RX ORDER — BUPROPION HYDROCHLORIDE 150 MG/1
1 TABLET, EXTENDED RELEASE ORAL EVERY 12 HOURS SCHEDULED
COMMUNITY
Start: 2024-09-05

## 2024-10-23 RX ORDER — ATORVASTATIN CALCIUM 20 MG/1
1 TABLET, FILM COATED ORAL DAILY
COMMUNITY
Start: 2024-07-31

## 2024-10-23 RX ORDER — LOSARTAN POTASSIUM 100 MG/1
1 TABLET ORAL DAILY
COMMUNITY
Start: 2024-09-13

## 2024-10-25 LAB — REF LAB TEST METHOD: NORMAL
